# Patient Record
Sex: MALE | Race: OTHER | NOT HISPANIC OR LATINO | URBAN - METROPOLITAN AREA
[De-identification: names, ages, dates, MRNs, and addresses within clinical notes are randomized per-mention and may not be internally consistent; named-entity substitution may affect disease eponyms.]

---

## 2017-09-20 ENCOUNTER — EMERGENCY (EMERGENCY)
Facility: HOSPITAL | Age: 67
LOS: 1 days | Discharge: HOME | End: 2017-09-20
Attending: EMERGENCY MEDICINE | Admitting: EMERGENCY MEDICINE
Payer: MEDICAID

## 2017-09-20 VITALS
RESPIRATION RATE: 15 BRPM | TEMPERATURE: 98 F | HEART RATE: 78 BPM | OXYGEN SATURATION: 99 % | DIASTOLIC BLOOD PRESSURE: 75 MMHG | SYSTOLIC BLOOD PRESSURE: 129 MMHG

## 2017-09-20 DIAGNOSIS — Z98.89 OTHER SPECIFIED POSTPROCEDURAL STATES: Chronic | ICD-10-CM

## 2017-09-20 DIAGNOSIS — Z98.49 CATARACT EXTRACTION STATUS, UNSPECIFIED EYE: Chronic | ICD-10-CM

## 2017-09-20 LAB
ALBUMIN SERPL ELPH-MCNC: 4.1 G/DL — SIGNIFICANT CHANGE UP (ref 3.3–5)
ALP SERPL-CCNC: 77 U/L — SIGNIFICANT CHANGE UP (ref 40–120)
ALT FLD-CCNC: 19 U/L — SIGNIFICANT CHANGE UP (ref 4–41)
APPEARANCE UR: CLEAR — SIGNIFICANT CHANGE UP
AST SERPL-CCNC: 20 U/L — SIGNIFICANT CHANGE UP (ref 4–40)
BASOPHILS # BLD AUTO: 0.05 K/UL — SIGNIFICANT CHANGE UP (ref 0–0.2)
BASOPHILS NFR BLD AUTO: 0.7 % — SIGNIFICANT CHANGE UP (ref 0–2)
BILIRUB SERPL-MCNC: 0.3 MG/DL — SIGNIFICANT CHANGE UP (ref 0.2–1.2)
BILIRUB UR-MCNC: NEGATIVE — SIGNIFICANT CHANGE UP
BLOOD UR QL VISUAL: NEGATIVE — SIGNIFICANT CHANGE UP
BUN SERPL-MCNC: 30 MG/DL — HIGH (ref 7–23)
CALCIUM SERPL-MCNC: 8.7 MG/DL — SIGNIFICANT CHANGE UP (ref 8.4–10.5)
CHLORIDE SERPL-SCNC: 111 MMOL/L — HIGH (ref 98–107)
CK SERPL-CCNC: 291 U/L — HIGH (ref 30–200)
CO2 SERPL-SCNC: 17 MMOL/L — LOW (ref 22–31)
COLOR SPEC: YELLOW — SIGNIFICANT CHANGE UP
CREAT SERPL-MCNC: 1.34 MG/DL — HIGH (ref 0.5–1.3)
EOSINOPHIL # BLD AUTO: 0.38 K/UL — SIGNIFICANT CHANGE UP (ref 0–0.5)
EOSINOPHIL NFR BLD AUTO: 5.2 % — SIGNIFICANT CHANGE UP (ref 0–6)
GLUCOSE SERPL-MCNC: 174 MG/DL — HIGH (ref 70–99)
GLUCOSE UR-MCNC: 50 — SIGNIFICANT CHANGE UP
HCT VFR BLD CALC: 38.7 % — LOW (ref 39–50)
HGB BLD-MCNC: 13.3 G/DL — SIGNIFICANT CHANGE UP (ref 13–17)
HYALINE CASTS # UR AUTO: SIGNIFICANT CHANGE UP (ref 0–?)
IMM GRANULOCYTES # BLD AUTO: 0.01 # — SIGNIFICANT CHANGE UP
IMM GRANULOCYTES NFR BLD AUTO: 0.1 % — SIGNIFICANT CHANGE UP (ref 0–1.5)
KETONES UR-MCNC: NEGATIVE — SIGNIFICANT CHANGE UP
LEUKOCYTE ESTERASE UR-ACNC: NEGATIVE — SIGNIFICANT CHANGE UP
LIDOCAIN IGE QN: 73.4 U/L — HIGH (ref 7–60)
LYMPHOCYTES # BLD AUTO: 2.67 K/UL — SIGNIFICANT CHANGE UP (ref 1–3.3)
LYMPHOCYTES # BLD AUTO: 36.3 % — SIGNIFICANT CHANGE UP (ref 13–44)
MCHC RBC-ENTMCNC: 31.1 PG — SIGNIFICANT CHANGE UP (ref 27–34)
MCHC RBC-ENTMCNC: 34.4 % — SIGNIFICANT CHANGE UP (ref 32–36)
MCV RBC AUTO: 90.6 FL — SIGNIFICANT CHANGE UP (ref 80–100)
MONOCYTES # BLD AUTO: 0.67 K/UL — SIGNIFICANT CHANGE UP (ref 0–0.9)
MONOCYTES NFR BLD AUTO: 9.1 % — SIGNIFICANT CHANGE UP (ref 2–14)
MUCOUS THREADS # UR AUTO: SIGNIFICANT CHANGE UP
NEUTROPHILS # BLD AUTO: 3.58 K/UL — SIGNIFICANT CHANGE UP (ref 1.8–7.4)
NEUTROPHILS NFR BLD AUTO: 48.6 % — SIGNIFICANT CHANGE UP (ref 43–77)
NITRITE UR-MCNC: NEGATIVE — SIGNIFICANT CHANGE UP
NRBC # FLD: 0 — SIGNIFICANT CHANGE UP
PH UR: 7 — SIGNIFICANT CHANGE UP (ref 4.6–8)
PLATELET # BLD AUTO: 192 K/UL — SIGNIFICANT CHANGE UP (ref 150–400)
PMV BLD: 9.7 FL — SIGNIFICANT CHANGE UP (ref 7–13)
POTASSIUM SERPL-MCNC: 3.9 MMOL/L — SIGNIFICANT CHANGE UP (ref 3.5–5.3)
POTASSIUM SERPL-SCNC: 3.9 MMOL/L — SIGNIFICANT CHANGE UP (ref 3.5–5.3)
PROT SERPL-MCNC: 6.9 G/DL — SIGNIFICANT CHANGE UP (ref 6–8.3)
PROT UR-MCNC: 20 — SIGNIFICANT CHANGE UP
RBC # BLD: 4.27 M/UL — SIGNIFICANT CHANGE UP (ref 4.2–5.8)
RBC # FLD: 13.4 % — SIGNIFICANT CHANGE UP (ref 10.3–14.5)
RBC CASTS # UR COMP ASSIST: SIGNIFICANT CHANGE UP (ref 0–?)
SODIUM SERPL-SCNC: 139 MMOL/L — SIGNIFICANT CHANGE UP (ref 135–145)
SP GR SPEC: 1.02 — SIGNIFICANT CHANGE UP (ref 1–1.03)
UROBILINOGEN FLD QL: NORMAL E.U. — SIGNIFICANT CHANGE UP (ref 0.1–0.2)
WBC # BLD: 7.36 K/UL — SIGNIFICANT CHANGE UP (ref 3.8–10.5)
WBC # FLD AUTO: 7.36 K/UL — SIGNIFICANT CHANGE UP (ref 3.8–10.5)
WBC UR QL: SIGNIFICANT CHANGE UP (ref 0–?)

## 2017-09-20 PROCEDURE — 71020: CPT | Mod: 26

## 2017-09-20 PROCEDURE — 99285 EMERGENCY DEPT VISIT HI MDM: CPT

## 2017-09-20 RX ORDER — ASPIRIN/CALCIUM CARB/MAGNESIUM 324 MG
162 TABLET ORAL ONCE
Qty: 0 | Refills: 0 | Status: COMPLETED | OUTPATIENT
Start: 2017-09-20 | End: 2017-09-20

## 2017-09-20 NOTE — ED PROVIDER NOTE - ATTENDING CONTRIBUTION TO CARE
Dr. Olvera: I have personally seen and examined this patient at the bedside. I have fully participated in the care of this patient. I have reviewed all pertinent clinical information, including history, physical exam, plan and the Resident's note and agree except as noted. HPI above as by me. PE above as by me. 67M heart score 3 p/w exertional cp and epigas abd pain. EKG nsr nl axis isolated st abn v2 neg reciprocal. PLAN ce x 1, xr, cdu for ce#2 and nuc stress.

## 2017-09-20 NOTE — ED PROVIDER NOTE - PSH
History of cholecystectomy  1977  S/P appendectomy  1966  S/P arthroscopy of shoulder  left -2013, right -2014  S/P cataract extraction  bilateral eyes

## 2017-09-20 NOTE — ED PROVIDER NOTE - OBJECTIVE STATEMENT
22:02 May att: 67M c/o wk, numb, cp, abd pain. Multiple complaints. Past one week numbness bilateral hand, tip of tongue, entire stomach. Weakness, eyes feel like closing. Abd pain, upper epigastrum, constant, churning, nonrad. Denies f, n, v. Also notes chest heaviness, worse with treadmill use, "associated with dizzy and warm flushing." No known cad, no stents, stress yr unk, angio 1 yr ago.  ALL stemetial PMH hld, dm PSH bessie, appy MED simva, ramipril, glucophage, metformin, insulin PCP Skip

## 2017-09-20 NOTE — ED PROVIDER NOTE - MEDICAL DECISION MAKING DETAILS
Olvera: 67M heart score 3 p/w exertional cp and epigas abd pain. EKG nsr nl axis isolated st abn v2 neg reciprocal. PLAN ce x 1, xr, cdu for ce#2 and nuc stress

## 2017-09-20 NOTE — ED ADULT TRIAGE NOTE - CHIEF COMPLAINT QUOTE
Pt c/o lethargy, "dull" upper abdominal pain that becomes "numb" for past couple of days.  PMHx HTN, DM

## 2017-09-20 NOTE — ED PROVIDER NOTE - FAMILY HISTORY
Family history of pancreatic cancer     Father  Still living? Unknown  Diabetes mellitus, type 2, Age at diagnosis: Age Unknown

## 2017-09-20 NOTE — ED ADULT NURSE NOTE - OBJECTIVE STATEMENT
Celina RN: received pt to room 2 for evaluation of multiple complaints- intermittent b/l arm and leg numbness and tingling, with tip of tongue numbness, abdominal "fire" and numbness x weeks, unresolved with unsteady gait and trip and fall x couple days ago. pt also endorses feeling lethargic and weak and states " I feel like something is wrong with my body". pt presents awake a&ox4, denies dizziness or ha. skin warm, dry, appropriate for race. abrasion to right knee secondary to fall per pt. respirations even unlabored. lungs cta, denies cp or sob. abdomen soft nontender nondistended. denies n/v/d. denies fever or chills. ivl placed. bloods drawn and sent. daughters at bedside. awaiting md evaluation. report to primary RN Juliana

## 2017-09-21 VITALS
TEMPERATURE: 98 F | SYSTOLIC BLOOD PRESSURE: 121 MMHG | RESPIRATION RATE: 16 BRPM | OXYGEN SATURATION: 100 % | DIASTOLIC BLOOD PRESSURE: 64 MMHG | HEART RATE: 73 BPM

## 2017-09-21 LAB
CK MB BLD-MCNC: 3.1 — HIGH (ref 0–2.5)
CK MB BLD-MCNC: 3.2 — HIGH (ref 0–2.5)
CK MB BLD-MCNC: 8.59 NG/ML — HIGH (ref 1–6.6)
CK MB BLD-MCNC: 9.21 NG/ML — HIGH (ref 1–6.6)
CK SERPL-CCNC: 276 U/L — HIGH (ref 30–200)
HBA1C BLD-MCNC: 8.1 % — HIGH (ref 4–5.6)
TROPONIN T SERPL-MCNC: < 0.06 NG/ML — SIGNIFICANT CHANGE UP (ref 0–0.06)
TROPONIN T SERPL-MCNC: < 0.06 NG/ML — SIGNIFICANT CHANGE UP (ref 0–0.06)

## 2017-09-21 PROCEDURE — 74177 CT ABD & PELVIS W/CONTRAST: CPT | Mod: 26

## 2017-09-21 PROCEDURE — 99236 HOSP IP/OBS SAME DATE HI 85: CPT

## 2017-09-21 PROCEDURE — 93016 CV STRESS TEST SUPVJ ONLY: CPT | Mod: GC

## 2017-09-21 PROCEDURE — 93018 CV STRESS TEST I&R ONLY: CPT | Mod: GC

## 2017-09-21 PROCEDURE — 78452 HT MUSCLE IMAGE SPECT MULT: CPT | Mod: 26

## 2017-09-21 RX ORDER — GLUCAGON INJECTION, SOLUTION 0.5 MG/.1ML
1 INJECTION, SOLUTION SUBCUTANEOUS ONCE
Qty: 0 | Refills: 0 | Status: DISCONTINUED | OUTPATIENT
Start: 2017-09-21 | End: 2017-09-24

## 2017-09-21 RX ORDER — INSULIN LISPRO 100/ML
16 VIAL (ML) SUBCUTANEOUS
Qty: 0 | Refills: 0 | Status: DISCONTINUED | OUTPATIENT
Start: 2017-09-21 | End: 2017-09-24

## 2017-09-21 RX ORDER — SODIUM CHLORIDE 9 MG/ML
1000 INJECTION, SOLUTION INTRAVENOUS
Qty: 0 | Refills: 0 | Status: DISCONTINUED | OUTPATIENT
Start: 2017-09-21 | End: 2017-09-24

## 2017-09-21 RX ORDER — DEXTROSE 50 % IN WATER 50 %
25 SYRINGE (ML) INTRAVENOUS ONCE
Qty: 0 | Refills: 0 | Status: DISCONTINUED | OUTPATIENT
Start: 2017-09-21 | End: 2017-09-24

## 2017-09-21 RX ORDER — ACETAZOLAMIDE 250 MG/1
500 TABLET ORAL DAILY
Qty: 0 | Refills: 0 | Status: DISCONTINUED | OUTPATIENT
Start: 2017-09-21 | End: 2017-09-24

## 2017-09-21 RX ORDER — LISINOPRIL 2.5 MG/1
20 TABLET ORAL DAILY
Qty: 0 | Refills: 0 | Status: DISCONTINUED | OUTPATIENT
Start: 2017-09-21 | End: 2017-09-24

## 2017-09-21 RX ORDER — SODIUM CHLORIDE 9 MG/ML
1000 INJECTION INTRAMUSCULAR; INTRAVENOUS; SUBCUTANEOUS ONCE
Qty: 0 | Refills: 0 | Status: COMPLETED | OUTPATIENT
Start: 2017-09-21 | End: 2017-09-21

## 2017-09-21 RX ORDER — DEXTROSE 50 % IN WATER 50 %
1 SYRINGE (ML) INTRAVENOUS ONCE
Qty: 0 | Refills: 0 | Status: DISCONTINUED | OUTPATIENT
Start: 2017-09-21 | End: 2017-09-24

## 2017-09-21 RX ORDER — DEXTROSE 50 % IN WATER 50 %
12.5 SYRINGE (ML) INTRAVENOUS ONCE
Qty: 0 | Refills: 0 | Status: DISCONTINUED | OUTPATIENT
Start: 2017-09-21 | End: 2017-09-24

## 2017-09-21 RX ORDER — INSULIN LISPRO 100/ML
8 VIAL (ML) SUBCUTANEOUS ONCE
Qty: 0 | Refills: 0 | Status: COMPLETED | OUTPATIENT
Start: 2017-09-21 | End: 2017-09-21

## 2017-09-21 RX ORDER — ATORVASTATIN CALCIUM 80 MG/1
40 TABLET, FILM COATED ORAL AT BEDTIME
Qty: 0 | Refills: 0 | Status: DISCONTINUED | OUTPATIENT
Start: 2017-09-21 | End: 2017-09-24

## 2017-09-21 RX ADMIN — ACETAZOLAMIDE 500 MILLIGRAM(S): 250 TABLET ORAL at 12:07

## 2017-09-21 RX ADMIN — SODIUM CHLORIDE 1000 MILLILITER(S): 9 INJECTION INTRAMUSCULAR; INTRAVENOUS; SUBCUTANEOUS at 09:46

## 2017-09-21 RX ADMIN — Medication 16 UNIT(S): at 13:11

## 2017-09-21 RX ADMIN — Medication 162 MILLIGRAM(S): at 00:19

## 2017-09-21 RX ADMIN — Medication 8 UNIT(S): at 09:30

## 2017-09-21 RX ADMIN — LISINOPRIL 20 MILLIGRAM(S): 2.5 TABLET ORAL at 05:13

## 2017-09-21 NOTE — ED CDU PROVIDER NOTE - MEDICAL DECISION MAKING DETAILS
68 y/o M sent to CDU for r/o ACS w/u; upon examination pt with tender LUQ; given hx will get CT abdomen to r/o pancreatic mass.

## 2017-09-21 NOTE — ED CDU PROVIDER NOTE - CHPI ED SYMPTOMS NEG
no chills/no nausea/no cough/no syncope/no diaphoresis/no fever/no dizziness/no vomiting/no back pain

## 2017-09-21 NOTE — ED CDU PROVIDER NOTE - PROGRESS NOTE DETAILS
CDU MILENA CISNEROS - Pt resting comfortably. No complaints. VSS. CE x 2 negative. Stress pending this am. Will continue to observe and reassess in am CDU DC note Eric: Pt. NAD at time of DC. Was concerned re: panc. Ca, denies wt. loss, no jaundice, no pale stools. Had neg. CT in ED, to FU PMD and Cardiology as outpt. Pt. may take H2 blocker empirically prior to PMD visit.

## 2017-09-21 NOTE — ED CDU PROVIDER NOTE - OBJECTIVE STATEMENT
66 y/o M PMH HTN, DM, HLD c/o epigastric/luq pain x 2 weeks with associated bloating and decreased appetite. Pt also notes exertional CP, EMERSON, and orthopnea for the same time frame. 68 y/o M PMH HTN, DM, HLD c/o epigastric/luq pain x 2 weeks with associated bloating and decreased appetite. Pt also notes exertional CP, EMERSON, and orthopnea for the same time frame. Also notes "numbness" to his hands and perioral area - when asked if pt was able to feel light touch there he said "yes. it feels more like a heaviness". Pt concerned because his father had pancreatic ca. Denies fever, chills, cough, N/V/D/constipation, dysuria, hematuria, back pain, le edema. LBM today - normal.

## 2017-09-21 NOTE — ED CDU PROVIDER NOTE - ATTENDING CONTRIBUTION TO CARE
Seen and examined, have discussed plan of care with PA.   I agree with note as stated above, having amended the EMR as needed to reflect the findings. Pt. NAD at time of exam, returned from stress test, denies sx during test. States last had treadmill stress ~3 yrs ago, was done as "routine" with no sx. Clear lungs, heart reg, no murmur. Has PMD FU.

## 2018-06-01 ENCOUNTER — OUTPATIENT (OUTPATIENT)
Dept: OUTPATIENT SERVICES | Facility: HOSPITAL | Age: 68
LOS: 1 days | End: 2018-06-01
Payer: MEDICAID

## 2018-06-01 DIAGNOSIS — Z98.49 CATARACT EXTRACTION STATUS, UNSPECIFIED EYE: Chronic | ICD-10-CM

## 2018-06-01 DIAGNOSIS — Z98.89 OTHER SPECIFIED POSTPROCEDURAL STATES: Chronic | ICD-10-CM

## 2018-06-01 PROCEDURE — G9001: CPT

## 2018-06-14 DIAGNOSIS — R69 ILLNESS, UNSPECIFIED: ICD-10-CM

## 2018-07-01 ENCOUNTER — OUTPATIENT (OUTPATIENT)
Dept: OUTPATIENT SERVICES | Facility: HOSPITAL | Age: 68
LOS: 1 days | End: 2018-07-01

## 2018-07-01 DIAGNOSIS — Z98.49 CATARACT EXTRACTION STATUS, UNSPECIFIED EYE: Chronic | ICD-10-CM

## 2018-07-01 DIAGNOSIS — Z98.89 OTHER SPECIFIED POSTPROCEDURAL STATES: Chronic | ICD-10-CM

## 2018-07-04 ENCOUNTER — EMERGENCY (EMERGENCY)
Facility: HOSPITAL | Age: 68
LOS: 1 days | Discharge: HOME | End: 2018-07-04
Attending: EMERGENCY MEDICINE | Admitting: EMERGENCY MEDICINE
Payer: MEDICAID

## 2018-07-04 VITALS
RESPIRATION RATE: 17 BRPM | OXYGEN SATURATION: 100 % | SYSTOLIC BLOOD PRESSURE: 106 MMHG | HEART RATE: 68 BPM | DIASTOLIC BLOOD PRESSURE: 64 MMHG

## 2018-07-04 VITALS
DIASTOLIC BLOOD PRESSURE: 68 MMHG | OXYGEN SATURATION: 100 % | SYSTOLIC BLOOD PRESSURE: 100 MMHG | TEMPERATURE: 98 F | RESPIRATION RATE: 18 BRPM | HEART RATE: 72 BPM

## 2018-07-04 DIAGNOSIS — Z98.49 CATARACT EXTRACTION STATUS, UNSPECIFIED EYE: Chronic | ICD-10-CM

## 2018-07-04 DIAGNOSIS — Z98.89 OTHER SPECIFIED POSTPROCEDURAL STATES: Chronic | ICD-10-CM

## 2018-07-04 LAB
ALBUMIN SERPL ELPH-MCNC: 4.4 G/DL — SIGNIFICANT CHANGE UP (ref 3.3–5)
ALP SERPL-CCNC: 102 U/L — SIGNIFICANT CHANGE UP (ref 40–120)
ALT FLD-CCNC: 15 U/L — SIGNIFICANT CHANGE UP (ref 4–41)
APPEARANCE UR: CLEAR — SIGNIFICANT CHANGE UP
AST SERPL-CCNC: 17 U/L — SIGNIFICANT CHANGE UP (ref 4–40)
BASOPHILS # BLD AUTO: 0.05 K/UL — SIGNIFICANT CHANGE UP (ref 0–0.2)
BASOPHILS NFR BLD AUTO: 0.5 % — SIGNIFICANT CHANGE UP (ref 0–2)
BILIRUB SERPL-MCNC: 0.3 MG/DL — SIGNIFICANT CHANGE UP (ref 0.2–1.2)
BILIRUB UR-MCNC: NEGATIVE — SIGNIFICANT CHANGE UP
BLOOD UR QL VISUAL: NEGATIVE — SIGNIFICANT CHANGE UP
BUN SERPL-MCNC: 28 MG/DL — HIGH (ref 7–23)
CALCIUM SERPL-MCNC: 8.3 MG/DL — LOW (ref 8.4–10.5)
CHLORIDE SERPL-SCNC: 114 MMOL/L — HIGH (ref 98–107)
CK SERPL-CCNC: 244 U/L — HIGH (ref 30–200)
CO2 SERPL-SCNC: 19 MMOL/L — LOW (ref 22–31)
COLOR SPEC: SIGNIFICANT CHANGE UP
CREAT SERPL-MCNC: 1.26 MG/DL — SIGNIFICANT CHANGE UP (ref 0.5–1.3)
EOSINOPHIL # BLD AUTO: 0.4 K/UL — SIGNIFICANT CHANGE UP (ref 0–0.5)
EOSINOPHIL NFR BLD AUTO: 4 % — SIGNIFICANT CHANGE UP (ref 0–6)
GLUCOSE SERPL-MCNC: 43 MG/DL — CRITICAL LOW (ref 70–99)
GLUCOSE UR-MCNC: 300 — SIGNIFICANT CHANGE UP
HCT VFR BLD CALC: 37.3 % — LOW (ref 39–50)
HGB BLD-MCNC: 12.7 G/DL — LOW (ref 13–17)
IMM GRANULOCYTES # BLD AUTO: 0.04 # — SIGNIFICANT CHANGE UP
IMM GRANULOCYTES NFR BLD AUTO: 0.4 % — SIGNIFICANT CHANGE UP (ref 0–1.5)
KETONES UR-MCNC: NEGATIVE — SIGNIFICANT CHANGE UP
LEUKOCYTE ESTERASE UR-ACNC: NEGATIVE — SIGNIFICANT CHANGE UP
LYMPHOCYTES # BLD AUTO: 1.7 K/UL — SIGNIFICANT CHANGE UP (ref 1–3.3)
LYMPHOCYTES # BLD AUTO: 17.1 % — SIGNIFICANT CHANGE UP (ref 13–44)
MCHC RBC-ENTMCNC: 31.1 PG — SIGNIFICANT CHANGE UP (ref 27–34)
MCHC RBC-ENTMCNC: 34 % — SIGNIFICANT CHANGE UP (ref 32–36)
MCV RBC AUTO: 91.4 FL — SIGNIFICANT CHANGE UP (ref 80–100)
MONOCYTES # BLD AUTO: 0.73 K/UL — SIGNIFICANT CHANGE UP (ref 0–0.9)
MONOCYTES NFR BLD AUTO: 7.3 % — SIGNIFICANT CHANGE UP (ref 2–14)
MUCOUS THREADS # UR AUTO: SIGNIFICANT CHANGE UP
NEUTROPHILS # BLD AUTO: 7.03 K/UL — SIGNIFICANT CHANGE UP (ref 1.8–7.4)
NEUTROPHILS NFR BLD AUTO: 70.7 % — SIGNIFICANT CHANGE UP (ref 43–77)
NITRITE UR-MCNC: NEGATIVE — SIGNIFICANT CHANGE UP
NRBC # FLD: 0 — SIGNIFICANT CHANGE UP
PH UR: 6.5 — SIGNIFICANT CHANGE UP (ref 4.6–8)
PLATELET # BLD AUTO: 169 K/UL — SIGNIFICANT CHANGE UP (ref 150–400)
PMV BLD: 9.2 FL — SIGNIFICANT CHANGE UP (ref 7–13)
POTASSIUM SERPL-MCNC: 3.2 MMOL/L — LOW (ref 3.5–5.3)
POTASSIUM SERPL-SCNC: 3.2 MMOL/L — LOW (ref 3.5–5.3)
PROT SERPL-MCNC: 6.9 G/DL — SIGNIFICANT CHANGE UP (ref 6–8.3)
PROT UR-MCNC: 20 MG/DL — SIGNIFICANT CHANGE UP
RBC # BLD: 4.08 M/UL — LOW (ref 4.2–5.8)
RBC # FLD: 13.3 % — SIGNIFICANT CHANGE UP (ref 10.3–14.5)
RBC CASTS # UR COMP ASSIST: SIGNIFICANT CHANGE UP (ref 0–?)
SODIUM SERPL-SCNC: 144 MMOL/L — SIGNIFICANT CHANGE UP (ref 135–145)
SP GR SPEC: 1.01 — SIGNIFICANT CHANGE UP (ref 1–1.04)
SQUAMOUS # UR AUTO: SIGNIFICANT CHANGE UP
UROBILINOGEN FLD QL: NORMAL MG/DL — SIGNIFICANT CHANGE UP
WBC # BLD: 9.95 K/UL — SIGNIFICANT CHANGE UP (ref 3.8–10.5)
WBC # FLD AUTO: 9.95 K/UL — SIGNIFICANT CHANGE UP (ref 3.8–10.5)
WBC UR QL: SIGNIFICANT CHANGE UP (ref 0–?)

## 2018-07-04 PROCEDURE — 99285 EMERGENCY DEPT VISIT HI MDM: CPT | Mod: 25

## 2018-07-04 PROCEDURE — 93010 ELECTROCARDIOGRAM REPORT: CPT

## 2018-07-04 PROCEDURE — 71045 X-RAY EXAM CHEST 1 VIEW: CPT | Mod: 26

## 2018-07-04 RX ORDER — ALBUTEROL 90 UG/1
2.5 AEROSOL, METERED ORAL ONCE
Qty: 0 | Refills: 0 | Status: COMPLETED | OUTPATIENT
Start: 2018-07-04 | End: 2018-07-04

## 2018-07-04 RX ORDER — DEXTROSE 50 % IN WATER 50 %
50 SYRINGE (ML) INTRAVENOUS ONCE
Qty: 0 | Refills: 0 | Status: COMPLETED | OUTPATIENT
Start: 2018-07-04 | End: 2018-07-04

## 2018-07-04 RX ADMIN — ALBUTEROL 2.5 MILLIGRAM(S): 90 AEROSOL, METERED ORAL at 22:30

## 2018-07-04 RX ADMIN — Medication 50 MILLILITER(S): at 22:12

## 2018-07-04 NOTE — ED PROVIDER NOTE - PROGRESS NOTE DETAILS
mis: PT seen and reassessed.  Patient symptomatically improved.   AAOX3, NAD, VSS.  Discussed test results w/ patient. Patient verbalized understanding of hospital course and outpatient plans, has decisional making capacity.  Will f/u w/ pmd in the next few days; patient will call for an appointment. Will return to the ED if there is any worsening of symptoms.  Patient able to ambulate at baseline, is tolerating PO intake

## 2018-07-04 NOTE — ED ADULT TRIAGE NOTE - CHIEF COMPLAINT QUOTE
hypoglycemic/seizure    pt states was in his regular state of health... had last ate at 1430...felt a little hungry but decided to finish house chores outdoors.  felt lightheaded...walked to front of house.... and had witnessed seizure.  rec'd aa&ox4.  denies any pain at this time.  as per ems, acucheck was LO...rec'd d50....rpt accucheck wa 150. in triag ewas 110.  hl from ems is out.  denies tongue biting, incontinence. hypoglycemic/seizure    pt states was in his regular state of health... had last ate at 1430...felt a little hungry but decided to finish house chores outdoors.  felt lightheaded...walked to front of house.... and had witnessed seizure.  rec'd aa&ox4.  denies any pain at this time.  as per ems, acucheck was LO...rec'd d50....rpt accucheck wa 150. in triag ewas 110.  hl from ems is out.  denies tongue biting, incontinence. given juice in triage hypoglycemic/seizure    pt states was in his regular state of health... had last ate at 1430...felt a little hungry but decided to finish house chores outdoors.  felt lightheaded... walked to front of house.... and had witnessed seizure.  rec'd aa&ox4.  denies any pain at this time.  as per ems, accucheck was LO...rec'd d50....rpt accucheck was 150. in triage was 110.  hl from ems is out.  denies tongue biting, incontinence. given juice in triage

## 2018-07-04 NOTE — ED PROVIDER NOTE - ATTENDING CONTRIBUTION TO CARE
IRISH ESQUIVEL  ATTENDING, MD: 67yo M past medical history, IDDM, Hypertension. hyperlipidemia, brought in by EMS for witnessed seizure like activity a/w hypoglycemia. Patient states he checked F/S and noted 110's and then took his insulin without eating.  Patient denies any current complaint.  Patient denies HA, NP, chest pain, SOB, cough, abd pain, N/V/D/C, weakness, dizziness, urinary symptoms, extremity pain or swelling or other complaints.     PHYSICAL EXAM:    GENERAL: Patient is awake and alert and in no acute distress.  Non-toxic appearing.  A+Ox4  HEAD:  Airway patent.  No oropharyngeal edema.  No stridor.  Auricles are normal.    EYES: EOM grossly intact, PERRLA, conjunctiva non-injected and sclera clear  NECK: Supple, No vertebral point tenderness to palpation.  CHEST/LUNG: Lungs clear to auscultation bilaterally; no wheeze, no rhonchi,  no rales.    HEART: Regular rate and rhythm;   ABDOMEN: Soft, non-tender to palpation.  No rebound/no guarding.  Bowel sounds present x 4.   MSK/EXTREMITIES: No clubbing, cyanosis, or edema. Back is nontender, with no vertebral point tenderness to palpation, no CVAT.  Moving all 4 extremities.     NEURO: Neurologically grossly intact.  CN II-XII intact.  5/5 strength x 4 extremities.  Ambulatory without ataxia. No obvious deficits.   PSYCH: Psychiatrically normal mood and affect.  No apparent risk to self or others.       DR. ESQUIVEL, ATTENDING MD:    I performed a face to face bedside interview with patient regarding history of present illness, review of symptoms and past medical history. I completed an independent physical exam.  I have discussed patient's plan of care with the team of health care providers.   I agree with note as stated above, having amended the EMR as needed to reflect my findings. I have discussed the assessment and plan of care.  This includes during the time I functioned as the attending physician for this patient.

## 2018-07-04 NOTE — ED PROVIDER NOTE - MEDICAL DECISION MAKING DETAILS
-impression: symptoms likely due to hypoglycemic sz from taking too much insulin. no e/o infection. pt w/o ha & witnessed to not have head trauma. will assess for pna. will wait till f/s normalize  -plan: labs, EKG, UA, CXR,

## 2018-07-04 NOTE — ED PROVIDER NOTE - OBJECTIVE STATEMENT
69yo m pmh iddm, glaucoma, htn, hld bibems for seizure like activity & hypoglycemia. pt has no complaints right now. 2.30pm had lunch after which f/s was in 110s & then took usual 17U humulog. around 6pm while was watering yard, pt was leaning on fence tremulous & diaphoretic. pt was helped to floor by neighbor & then had whole body shaking with tongue sticking out. ems found f/s to be low, gave amp d50 with immediate wake up & quick back to baseline.   had recent cough, rhinorhea  ROS negative for: fever, chest pain, SOB, Nausea, vomiting, diarrhea, abdominal pain, dysuria, ha, ac use, hx sz

## 2018-07-04 NOTE — ED ADULT NURSE NOTE - OBJECTIVE STATEMENT
pt receieved alert and oriented x3. PMHX DM2,HLD,HTN. pt c/c  doing house chores when he started feeling lightheaded and had  a witnessed seizure with a syncopal episode (+LOC) . pt denies any trauma to head. on EMS arrival pt was found to have a low Accu-Check and received d50. pt denies any chest pain, sob, lightheadedness, dizziness at the moment. pt denies any tongue biting, incontinence. Pt placed on continuous tele monitoring. nsr on cm vss  pt 20 g placed in right a/c. pt waiting for MD vasquez . will continue to monitor . family at bedside with pt, seizure precautions in  place. will continue to monitor

## 2018-07-04 NOTE — ED ADULT NURSE NOTE - CHIEF COMPLAINT QUOTE
hypoglycemic/seizure    pt states was in his regular state of health... had last ate at 1430...felt a little hungry but decided to finish house chores outdoors.  felt lightheaded... walked to front of house.... and had witnessed seizure.  rec'd aa&ox4.  denies any pain at this time.  as per ems, accucheck was LO...rec'd d50....rpt accucheck was 150. in triage was 110.  hl from ems is out.  denies tongue biting, incontinence. given juice in triage

## 2018-07-23 DIAGNOSIS — Z71.89 OTHER SPECIFIED COUNSELING: ICD-10-CM

## 2019-09-23 ENCOUNTER — EMERGENCY (EMERGENCY)
Facility: HOSPITAL | Age: 69
LOS: 1 days | Discharge: HOME | End: 2019-09-23
Attending: EMERGENCY MEDICINE | Admitting: EMERGENCY MEDICINE
Payer: MEDICARE

## 2019-09-23 VITALS
SYSTOLIC BLOOD PRESSURE: 166 MMHG | RESPIRATION RATE: 16 BRPM | DIASTOLIC BLOOD PRESSURE: 75 MMHG | OXYGEN SATURATION: 100 % | HEART RATE: 89 BPM

## 2019-09-23 VITALS
OXYGEN SATURATION: 100 % | DIASTOLIC BLOOD PRESSURE: 54 MMHG | TEMPERATURE: 97 F | SYSTOLIC BLOOD PRESSURE: 101 MMHG | RESPIRATION RATE: 15 BRPM | HEART RATE: 73 BPM

## 2019-09-23 DIAGNOSIS — Z98.89 OTHER SPECIFIED POSTPROCEDURAL STATES: Chronic | ICD-10-CM

## 2019-09-23 DIAGNOSIS — Z98.49 CATARACT EXTRACTION STATUS, UNSPECIFIED EYE: Chronic | ICD-10-CM

## 2019-09-23 LAB
ALBUMIN SERPL ELPH-MCNC: 4.7 G/DL — SIGNIFICANT CHANGE UP (ref 3.3–5)
ALP SERPL-CCNC: 87 U/L — SIGNIFICANT CHANGE UP (ref 40–120)
ALT FLD-CCNC: 23 U/L — SIGNIFICANT CHANGE UP (ref 4–41)
ANION GAP SERPL CALC-SCNC: 13 MMO/L — SIGNIFICANT CHANGE UP (ref 7–14)
AST SERPL-CCNC: 20 U/L — SIGNIFICANT CHANGE UP (ref 4–40)
B-OH-BUTYR SERPL-SCNC: < 0 MMOL/L — LOW (ref 0–0.4)
BASOPHILS # BLD AUTO: 0.05 K/UL — SIGNIFICANT CHANGE UP (ref 0–0.2)
BASOPHILS NFR BLD AUTO: 0.7 % — SIGNIFICANT CHANGE UP (ref 0–2)
BILIRUB SERPL-MCNC: 0.4 MG/DL — SIGNIFICANT CHANGE UP (ref 0.2–1.2)
BUN SERPL-MCNC: 26 MG/DL — HIGH (ref 7–23)
CALCIUM SERPL-MCNC: 8.8 MG/DL — SIGNIFICANT CHANGE UP (ref 8.4–10.5)
CHLORIDE SERPL-SCNC: 105 MMOL/L — SIGNIFICANT CHANGE UP (ref 98–107)
CO2 SERPL-SCNC: 18 MMOL/L — LOW (ref 22–31)
CREAT SERPL-MCNC: 1.14 MG/DL — SIGNIFICANT CHANGE UP (ref 0.5–1.3)
EOSINOPHIL # BLD AUTO: 0.14 K/UL — SIGNIFICANT CHANGE UP (ref 0–0.5)
EOSINOPHIL NFR BLD AUTO: 1.9 % — SIGNIFICANT CHANGE UP (ref 0–6)
GLUCOSE SERPL-MCNC: 396 MG/DL — HIGH (ref 70–99)
HCT VFR BLD CALC: 41.2 % — SIGNIFICANT CHANGE UP (ref 39–50)
HGB BLD-MCNC: 13.3 G/DL — SIGNIFICANT CHANGE UP (ref 13–17)
IMM GRANULOCYTES NFR BLD AUTO: 0.3 % — SIGNIFICANT CHANGE UP (ref 0–1.5)
LYMPHOCYTES # BLD AUTO: 1.28 K/UL — SIGNIFICANT CHANGE UP (ref 1–3.3)
LYMPHOCYTES # BLD AUTO: 17.3 % — SIGNIFICANT CHANGE UP (ref 13–44)
MCHC RBC-ENTMCNC: 30.8 PG — SIGNIFICANT CHANGE UP (ref 27–34)
MCHC RBC-ENTMCNC: 32.3 % — SIGNIFICANT CHANGE UP (ref 32–36)
MCV RBC AUTO: 95.4 FL — SIGNIFICANT CHANGE UP (ref 80–100)
MONOCYTES # BLD AUTO: 0.54 K/UL — SIGNIFICANT CHANGE UP (ref 0–0.9)
MONOCYTES NFR BLD AUTO: 7.3 % — SIGNIFICANT CHANGE UP (ref 2–14)
NEUTROPHILS # BLD AUTO: 5.38 K/UL — SIGNIFICANT CHANGE UP (ref 1.8–7.4)
NEUTROPHILS NFR BLD AUTO: 72.5 % — SIGNIFICANT CHANGE UP (ref 43–77)
NRBC # FLD: 0 K/UL — SIGNIFICANT CHANGE UP (ref 0–0)
PLATELET # BLD AUTO: 172 K/UL — SIGNIFICANT CHANGE UP (ref 150–400)
PMV BLD: 9.7 FL — SIGNIFICANT CHANGE UP (ref 7–13)
POTASSIUM SERPL-MCNC: 3.7 MMOL/L — SIGNIFICANT CHANGE UP (ref 3.5–5.3)
POTASSIUM SERPL-SCNC: 3.7 MMOL/L — SIGNIFICANT CHANGE UP (ref 3.5–5.3)
PROT SERPL-MCNC: 7.7 G/DL — SIGNIFICANT CHANGE UP (ref 6–8.3)
RBC # BLD: 4.32 M/UL — SIGNIFICANT CHANGE UP (ref 4.2–5.8)
RBC # FLD: 13.1 % — SIGNIFICANT CHANGE UP (ref 10.3–14.5)
SODIUM SERPL-SCNC: 136 MMOL/L — SIGNIFICANT CHANGE UP (ref 135–145)
WBC # BLD: 7.41 K/UL — SIGNIFICANT CHANGE UP (ref 3.8–10.5)
WBC # FLD AUTO: 7.41 K/UL — SIGNIFICANT CHANGE UP (ref 3.8–10.5)

## 2019-09-23 PROCEDURE — 99283 EMERGENCY DEPT VISIT LOW MDM: CPT | Mod: GC

## 2019-09-23 NOTE — ED PROVIDER NOTE - PATIENT PORTAL LINK FT
You can access the FollowMyHealth Patient Portal offered by [facility] by registering at the following website: [email or web address]. By joining [employer]’s FollowMyHealth portal, you will also be able to view your health information using other applications (apps) compatible with our system.

## 2019-09-23 NOTE — ED PROVIDER NOTE - CLINICAL SUMMARY MEDICAL DECISION MAKING FREE TEXT BOX
68 y/o M w/ PMH of IDDM, HTN, HLD p/w hypoglycemia. He was complaining of feeling sweaty and diaphoresis at 4:00pm and tried to drink some juice but it didn't resolve. His daughter then decided to call EMS and he was found to have a FS of 56 and gave him a D50 amp and a lot of juice and he vomited afterwards and was brought into the ED afterwards.  He also claimed to have eaten plums at 2pm but the children said he lied and hasn't eaten since breakfast and still took his premeal novolog 15U and has had had similar c/o hypoglycemia x3 now.

## 2019-09-23 NOTE — ED PROVIDER NOTE - NSFOLLOWUPINSTRUCTIONS_ED_ALL_ED_FT
Please followup with your endocrinologist on your doses of Novolog and Tarceva. Would possibly need adjustments and please continue having PO intake. Would need to monitor your fingersticks and keep a food log also.

## 2019-09-23 NOTE — ED ADULT NURSE NOTE - OBJECTIVE STATEMENT
Facilitator RN - Pt. received in room 13, A&Ox4, ambulatory. Pt. arrives with 18 gauge IV inserted in left ac by EMS. Pt. with hx of DM and glaucoma brought in by EMS for hypoglycemia. Pt. states he barely ate due to nausea and took his insulin at home, per family pt. was lethargic at home which typically happens when his sugar is low. Per triage note, pt. FS 56 upon EMS arrival, was given orange juice and vomiting, received 1 amp of d50 by EMS.  at present, as per his blood glucose monitoring his glucose is 435. Both taken at same time. MD Luevano aware and is at bedside for evaluation. Pt. denies abd pain, polyuria, polydipsia, polyphagia, chest pain, dizziness, weakness, SOB. Respirations even & unlabored on room air. VS as noted. 20 gauge IV inserted in right wrist, positive blood return, flushes without difficulty. Report given to RAJ Deleon.

## 2019-09-23 NOTE — ED ADULT TRIAGE NOTE - CHIEF COMPLAINT QUOTE
Pt brought in by EMS for hypoglycemia.  Pt states only ate half a muffin today and took his insulin regardless.  As per family, pt appears more lethargic than normal when his sugar is low.  FS 56 upon Ems arrival, drank orange juice but vomited.  Received 1 amp d50 by EMS to 18g to L AC.  PMHx:  cataract, HLD, glaucoma, DM, HTN

## 2019-09-23 NOTE — ED PROVIDER NOTE - OBJECTIVE STATEMENT
70 y/o M w/ PMH of IDDM, HTN, HLD p/w hypoglycemia. Has had similar c/o hypoglycemia in 7/4. 68 y/o M w/ PMH of IDDM, HTN, HLD p/w hypoglycemia. He was complaining of feeling sweaty and diaphoresis at 4:00pm and tried to drink some juice but it didn't resolve. His daughter then decided to call EMS and he was found to have a FS of 56 and gave him a D50 amp and a lot of juice and he vomited afterwards and was brought into the ED afterwards. Has had similar c/o hypoglycemia in 7/4. 70 y/o M w/ PMH of IDDM, HTN, HLD p/w hypoglycemia. He was complaining of feeling sweaty and diaphoresis at 4:00pm and tried to drink some juice but it didn't resolve. His daughter then decided to call EMS and he was found to have a FS of 56 and gave him a D50 amp and a lot of juice and he vomited afterwards and was brought into the ED afterwards.  He also claimed to have eaten plums at 2pm but the children said he lied and hasn't eaten since breakfast and still took his premeal novolog 15U and has had had similar c/o hypoglycemia x3 now.

## 2019-09-23 NOTE — ED PROVIDER NOTE - ATTENDING CONTRIBUTION TO CARE
Patient is a 68 yo M with history of IDDM, HTN, hyperlipidemia here for evaluation after episode of hypoglycemia. Patient checked his BS at 12 pm, 263, used 15 units of Novalog but only ate 2 plums at 2 pm. He felt shaky and sweaty at 4 pm. FS 56 when EMS evaluated him. He received 1 amp and orange juice and subsequently vomit. Patient has been in the hospital for hypoglycemia in the past. Denies fevers, chills, abdominal pain, urinary symptoms.     Tresiba - 45 units at night  Endocrine:     VS noted  Gen. no acute distress, Non toxic   HEENT: EOMI, mmm  Lungs: CTAB/L no C/ W /R   CVS: RRR   Abd; Soft non tender, non distended   Ext: no edema  Skin: no rash  Neuro AAOx3 non focal clear speech  a/p: hypoglycemia - pt did not eat appropriately.   - Bassem RAMSEY

## 2019-09-23 NOTE — ED PROVIDER NOTE - CARE PLAN
Principal Discharge DX:	Hypoglycemia  Goal:	Most likely due to not eating  Assessment and plan of treatment:	Most likely due to not eating

## 2021-11-10 ENCOUNTER — APPOINTMENT (OUTPATIENT)
Dept: UROLOGY | Facility: CLINIC | Age: 71
End: 2021-11-10
Payer: MEDICARE

## 2021-11-10 DIAGNOSIS — Z78.9 OTHER SPECIFIED HEALTH STATUS: ICD-10-CM

## 2021-11-10 DIAGNOSIS — Z87.19 PERSONAL HISTORY OF OTHER DISEASES OF THE DIGESTIVE SYSTEM: ICD-10-CM

## 2021-11-10 DIAGNOSIS — Z86.69 PERSONAL HISTORY OF OTHER DISEASES OF THE NERVOUS SYSTEM AND SENSE ORGANS: ICD-10-CM

## 2021-11-10 DIAGNOSIS — Z63.5 DISRUPTION OF FAMILY BY SEPARATION AND DIVORCE: ICD-10-CM

## 2021-11-10 PROCEDURE — 99204 OFFICE O/P NEW MOD 45 MIN: CPT

## 2021-11-10 SDOH — SOCIAL STABILITY - SOCIAL INSECURITY: DISRUPTION OF FAMILY BY SEPARATION AND DIVORCE: Z63.5

## 2021-11-10 NOTE — PHYSICAL EXAM
[General Appearance - Well Developed] : well developed [General Appearance - Well Nourished] : well nourished [Normal Appearance] : normal appearance [Well Groomed] : well groomed [General Appearance - In No Acute Distress] : no acute distress [Edema] : no peripheral edema [Respiration, Rhythm And Depth] : normal respiratory rhythm and effort [Exaggerated Use Of Accessory Muscles For Inspiration] : no accessory muscle use [Abdomen Soft] : soft [Abdomen Tenderness] : non-tender [Abdomen Mass (___ Cm)] : no abdominal mass palpated [Abdomen Hernia] : no hernia was discovered [Costovertebral Angle Tenderness] : no ~M costovertebral angle tenderness [Urethral Meatus] : meatus normal [Testes Tenderness] : no tenderness of the testes [Testes Mass (___cm)] : there were no testicular masses [FreeTextEntry1] : right hydrocele , sono showed normal testes bilat, good flow to right testes [Normal Station and Gait] : the gait and station were normal for the patient's age [] : no rash [No Focal Deficits] : no focal deficits [Oriented To Time, Place, And Person] : oriented to person, place, and time [Affect] : the affect was normal [Mood] : the mood was normal [Not Anxious] : not anxious [Cervical Lymph Nodes Enlarged Posterior Bilaterally] : posterior cervical [Cervical Lymph Nodes Enlarged Anterior Bilaterally] : anterior cervical [Supraclavicular Lymph Nodes Enlarged Bilaterally] : supraclavicular

## 2021-11-10 NOTE — ASSESSMENT
[FreeTextEntry1] : Hydrocele\par says getting bigger and now  bothered by it\par no LUTS\par no dysuria or hematurai\par no weight chagnes\par no strain to void \par wants surgery for hydrocele\par here for further eval \par 1) office sono showed no scrotal masses with large hydrocele on right \par 2) will refer to colleague\par 3) states had formal scrotal sono this year-will get report

## 2021-11-10 NOTE — HISTORY OF PRESENT ILLNESS
[FreeTextEntry1] : Hydrocele\par says getting bigger and now  bothered by it\par no LUTS\par no dysuria or hematurai\par no weight chagnes\par no strain to void \par wants surgery for hydrocele\par here for further eval

## 2021-11-10 NOTE — REVIEW OF SYSTEMS
[Eyesight Problems] : eyesight problems [Dry Eyes] : dryness of the eyes [Joint Pain] : joint pain [Joint Swelling] : joint swelling [Limb Swelling] : limb swelling [Muscle Weakness] : muscle weakness [Feelings Of Weakness] : feelings of weakness [Negative] : Heme/Lymph [see HPI] : see HPI [Dizziness] : dizziness [Limb Weakness] : limb weakness [Difficulty Walking] : difficulty walking [FreeTextEntry2] : HBP

## 2021-11-19 ENCOUNTER — APPOINTMENT (OUTPATIENT)
Dept: UROLOGY | Facility: CLINIC | Age: 71
End: 2021-11-19

## 2023-07-03 ENCOUNTER — APPOINTMENT (OUTPATIENT)
Dept: UROLOGY | Facility: CLINIC | Age: 73
End: 2023-07-03
Payer: MEDICARE

## 2023-07-03 PROCEDURE — 99213 OFFICE O/P EST LOW 20 MIN: CPT

## 2023-07-03 NOTE — HISTORY OF PRESENT ILLNESS
[FreeTextEntry1] : patient bothered by hydrocele - state now 'totally blind'\par wants sugery \par sl dysuia when wants to void -drinks water, teas/coffee \par admits to a lot of water - seen by Nephrology \par was told bty PCP - Dr Mosley that prostate OK \par loss of 33 lbs with new DM med\par good flow to void and feels empty after void \par here for f/u:

## 2023-07-03 NOTE — ASSESSMENT
[FreeTextEntry1] : patient bothered by hydrocele - state now 'totally blind'\par wants sugery \par sl dysuia when wants to void -drinks water, teas/coffee \par admits to a lot of water - seen by Nephrology \par was told bty PCP - Dr Mosley that prostate OK \par loss of 33 lbs with new DM med\par good flow to void and feels empty after void \par here for f/u:\par 1- scrotal sono \par 2- wants surgery for hydrocele \par 3- will refer to dr Banks for surgery\par

## 2023-07-08 NOTE — ED CDU PROVIDER NOTE - AGGRAVATING FACTORS
ZEYNEP ROSSI  SI-N 3A (Back) 020 A ([facility]-N 3A (Back))      Patient was evaluated and examined  by bedside, c/o mild anxiety , tolerating po diet well, no fever, no hypoxia, no agitation events, oob to chair       REVIEW OF SYSTEMS:  please see pertinent positives mentioned above, all other 12 ROS negative      T(C): , Max: 36.1 (07-07-23 @ 21:14)  HR: 85 (07-08-23 @ 05:00)  BP: 124/72 (07-08-23 @ 05:00)  RR: 20 (07-08-23 @ 05:00)  SpO2: 100% (07-07-23 @ 12:57)  CAPILLARY BLOOD GLUCOSE      POCT Blood Glucose.: 123 mg/dL (08 Jul 2023 06:25)  POCT Blood Glucose.: 104 mg/dL (07 Jul 2023 21:00)  POCT Blood Glucose.: 107 mg/dL (07 Jul 2023 16:51)  POCT Blood Glucose.: 99 mg/dL (07 Jul 2023 11:24)      PHYSICAL EXAM:  General: NAD, AAOX3, patient is laying comfortably in bed  HEENT: AT, NC, trach present  Lungs: good breath sounds B/L, no wheezing, no rhonchi  CVS: normal S1, S2, RRR, NO M/G/R  Abdomen: soft, bowel sounds present, non-tender, non-distended, peg present  Extremities: no edema, no clubbing, no cyanosis, positive peripheral pulses b/l  Neuro: no acute focal neurological deficits, generalized body weakness  Skin: dry scaly skin on hands/feet      LAB  CBC  Date: 07-07-23 @ 08:33  Mean cell Blshlaxxzq16.9  Mean cell Hemoglobin Conc30.3  Mean cell Volum 78.9  Platelet count-Automate 586  RBC Count 3.60  Red Cell Distrib Width26.9  WBC Count10.49  % Albumin, Urine--  Hematocrit 28.4  Hemoglobin 8.6  CBC  Date: 07-06-23 @ 12:34  Mean cell Xcbkhzfuct70.2  Mean cell Hemoglobin Conc30.9  Mean cell Volum 78.6  Platelet count-Automate 554  RBC Count 3.59  Red Cell Distrib Width26.8  WBC Count12.42  % Albumin, Urine--  Hematocrit 28.2  Hemoglobin 8.7  CBC  Date: 07-05-23 @ 07:54  Mean cell Nczbifeksl91.8  Mean cell Hemoglobin Conc30.7  Mean cell Volum 77.6  Platelet count-Automate 548  RBC Count 3.44  Red Cell Distrib Width27.4  WBC Count11.72  % Albumin, Urine--  Hematocrit 26.7  Hemoglobin 8.2  CBC  Date: 07-04-23 @ 05:25  Mean cell Hgkhrufgox01.0  Mean cell Hemoglobin Conc30.4  Mean cell Volum 79.2  Platelet count-Automate 464  RBC Count 3.41  Red Cell Distrib Width27.2  WBC Count12.61  % Albumin, Urine--  Hematocrit 27.0  Hemoglobin 8.2  CBC  Date: 07-03-23 @ 06:02  Mean cell Zmzzzkfcja51.6  Mean cell Hemoglobin Conc30.0  Mean cell Volum 78.7  Platelet count-Automate 318  RBC Count 3.56  Red Cell Distrib Width27.9  WBC Count12.43  % Albumin, Urine--  Hematocrit 28.0  Hemoglobin 8.4  CBC  Date: 07-02-23 @ 09:13  Mean cell Bjanllzwvn74.7  Mean cell Hemoglobin Conc30.4  Mean cell Volum 78.0  Platelet count-Automate 406  RBC Count 3.50  Red Cell Distrib Width27.3  WBC Count12.85  % Albumin, Urine--  Hematocrit 27.3  Hemoglobin 8.3    BMP  07-08-23 @ 01:28  Blood Gas Arterial-Calcium,Ionized--  Blood Urea Nitrogen, Serum 15 mg/dL [10 - 20]  Carbon Dioxide, Serum28 mmol/L [17 - 32]  Chloride, Serum97 mmol/L<L> [98 - 110]  Creatinie, Serum0.8 mg/dL [0.7 - 1.5]  Glucose, Fddql026 mg/dL<H> [70 - 99]  Potassium, Serum4.5 mmol/L [3.5 - 5.0]  Sodium, Serum 134 mmol/L<L> [135 - 146]  BMP  07-07-23 @ 21:39  Blood Gas Arterial-Calcium,Ionized--  Blood Urea Nitrogen, Serum 15 mg/dL [10 - 20]  Carbon Dioxide, Serum28 mmol/L [17 - 32]  Chloride, Serum92 mmol/L<L> [98 - 110]  Creatinie, Serum0.8 mg/dL [0.7 - 1.5]  Glucose, Ibwdf065 mg/dL<H> [70 - 99]  Potassium, Serum4.5 mmol/L [3.5 - 5.0]  Sodium, Serum 129 mmol/L<L> [135 - 146]  [facility]  07-07-23 @ 08:33  Blood Gas Arterial-Calcium,Ionized--  Blood Urea Nitrogen, Serum 16 mg/dL [10 - 20]  Carbon Dioxide, Serum29 mmol/L [17 - 32]  Chloride, Serum94 mmol/L<L> [98 - 110]  Creatinie, Serum0.8 mg/dL [0.7 - 1.5]  Glucose, Gkmlz421 mg/dL<H> [70 - 99]  Potassium, Serum5.5 mmol/L<H> [3.5 - 5.0]  Sodium, Serum 133 mmol/L<L> [135 - 146]  [facility]  07-06-23 @ 12:34  Blood Gas Arterial-Calcium,Ionized--  Blood Urea Nitrogen, Serum 17 mg/dL [10 - 20]  Carbon Dioxide, Serum29 mmol/L [17 - 32]  Chloride, Serum91 mmol/L<L> [98 - 110]  Creatinie, Serum0.9 mg/dL [0.7 - 1.5]  Glucose, Owwnk506 mg/dL<H> [70 - 99]  Potassium, Serum4.9 mmol/L [3.5 - 5.0]  Sodium, Serum 131 mmol/L<L> [135 - 146]  [facility]  07-05-23 @ 16:43  Blood Gas Arterial-Calcium,Ionized--  Blood Urea Nitrogen, Serum 16 mg/dL [10 - 20]  Carbon Dioxide, Serum27 mmol/L [17 - 32]  Chloride, Serum94 mmol/L<L> [98 - 110]  Creatinie, Serum0.9 mg/dL [0.7 - 1.5]  Glucose, Njfry494 mg/dL<H> [70 - 99]  Potassium, Serum5.3 mmol/L<H> [3.5 - 5.0]  Sodium, Serum 133 mmol/L<L> [135 - 146]  [facility]  07-05-23 @ 11:58  Blood Gas Arterial-Calcium,Ionized--  Blood Urea Nitrogen, Serum 16 mg/dL [10 - 20]  Carbon Dioxide, Serum25 mmol/L [17 - 32]  Chloride, Serum94 mmol/L<L> [98 - 110]  Creatinie, Serum0.9 mg/dL [0.7 - 1.5]  Glucose, Ufdvf411 mg/dL<H> [70 - 99]  Potassium, Serum6.1 mmol/L<HH> [3.5 - 5.0] [Hemolyzed. Interpret with caution  Critical value:  TYPE:(C=Critical, N=Notification, A=Abnormal) C  TESTS: K  DATE/TIME CALLED: 07/05/2023 14:10:15 EDT  CALLED TO: DR. SWENSON  READ BACK (2 Patient Identifiers)(Y/N): Y  READ BACK VALUES (Y/N): Y  CALLED BY: [facility]]  Sodium, Serum 132 mmol/L<L> [135 - 146]  [facility]  07-05-23 @ 07:54  Blood Gas Arterial-Calcium,Ionized--  Blood Urea Nitrogen, Serum 16 mg/dL [10 - 20]  Carbon Dioxide, Serum27 mmol/L [17 - 32]  Chloride, Serum93 mmol/L<L> [98 - 110]  Creatinie, Serum0.8 mg/dL [0.7 - 1.5]  Glucose, Ewpbo096 mg/dL<H> [70 - 99]  Potassium, Serum5.4 mmol/L<H> [3.5 - 5.0]  Sodium, Serum 131 mmol/L<L> [135 - 146]  [facility]  07-04-23 @ 05:25  Blood Gas Arterial-Calcium,Ionized--  Blood Urea Nitrogen, Serum 16 mg/dL [10 - 20]  Carbon Dioxide, Serum30 mmol/L [17 - 32]  Chloride, Serum94 mmol/L<L> [98 - 110]  Creatinie, Serum0.8 mg/dL [0.7 - 1.5]  Glucose, Kizns691 mg/dL<H> [70 - 99]  Potassium, Serum5.5 mmol/L<H> [3.5 - 5.0]  Sodium, Serum 134 mmol/L<L> [135 - 146]      Microbiology:    Culture - Sputum (collected 06-11-23 @ 11:47)  Source: Trach Asp Tracheal Aspirate  Gram Stain (06-11-23 @ 23:26):    Numerous polymorphonuclear leukocytes per low power field    Rare Squamous epithelial cells per low power field    No organisms seen per oil power field  Final Report (06-13-23 @ 16:58):    Normal Respiratory Laila present    Culture - Blood (collected 06-10-23 @ 11:41)  Source: .Blood None  Final Report (06-15-23 @ 20:01):    No Growth Final      Medications:  acetaminophen     Tablet .. 650 milliGRAM(s) Oral every 6 hours PRN  aluminum hydroxide/magnesium hydroxide/simethicone Suspension 30 milliLiter(s) Oral every 4 hours PRN  apixaban 5 milliGRAM(s) Oral every 12 hours  chlorhexidine 2% Cloths 1 Application(s) Topical <User Schedule>  clonazePAM  Tablet 0.5 milliGRAM(s) Oral three times a day PRN  diltiazem    Tablet 30 milliGRAM(s) Oral every 6 hours  famotidine    Tablet 20 milliGRAM(s) Oral daily  ferrous    sulfate Liquid 300 milliGRAM(s) Enteral Tube daily  metroNIDAZOLE    Tablet 500 milliGRAM(s) Oral every 8 hours  multivitamin 1 Tablet(s) Oral daily  nystatin Cream 1 Application(s) Topical two times a day  oxycodone    5 mG/acetaminophen 325 mG 1 Tablet(s) Oral/Enteral Tube every 6 hours PRN  petrolatum white Ointment 1 Application(s) Topical two times a day  senna 2 Tablet(s) Oral at bedtime  vancomycin  IVPB 500 milliGRAM(s) IV Intermittent every 12 hours  vitamin A &amp; D Ointment 1 Application(s) Topical daily        Assessment and Plan:  Patient is a 44 y/o female with PMH of Asthma, HTN c/w acute Influenza B viral pneumonia superimposed on cavitary MRSA pneumonia, acute hypoxic respiratory failure requiring intubation. Patient was initially admitted to ICU level of care, completed the course of zyvox and tamiflu.   Pneumonia was  complicated by loculated parapneumonic effusion s/p  chest tube placement/removal. s/p Tracheostomy and peg  placement   Patient's stay was complicated by MRSA bacteremia  currently on IV vancomycin and metronidazole, cultures negative to date. MASTER was negative for vegetations.   Her stay was also complicated by new onset Afib with RVR for which she is on amiodarone, diltiazem, and therapeutic anticoagulation.  Patient also had LANCE that improved with no need for RRT. Otherwise patient received a total of 6 p RBC for acute blood loss anemia ,currently hemoglobin stable. Patient transferred to step down unit and to Vent Unit with the following course of therapy:     Acute hypoxemic respiratory failure / s/P Trach and PEG    Severe cavitary CAP MRSA  Small parapneumonic effusion SP Chest tube- removed   MRSA bacteremia  Influenza B   hx of Asthma   Intubated 5/23 self-extubated 5/25, re-intubated 5/25; SBT failed, SP trach/G tube  - s/p Zyvox, meropenem and Tamifu, now on Vancomycin and flagyl, anticipated end date 7/9  - s/p course of steroids now off  -  blood cultures  NGTD SINCE 5/28   - s/p MASTER 5/31: No evidence of valvular vegetations or intracardiac abscesses to support IE. Pulmonary hepatisation incidental finding. EF 65%, mild TR and pulm HTN  - tapered off methadone( given for pain), d/c Zyprexa  -7/6/23: tapered  trach to collar, saturating 98%  -7/7/23 - 7/8/23 : patient tolerating well trach to collar. monitor for decreased secretions ,than will cap the trach, observe pt. clinically    # s/p peg placement-   -07/06/2023- patient was evaluated by speech tx. , post  video swallow on 7/7/23- started on diet.  -tolerating diet well, will d/c peg feedings      # Hyperkalemia- f/up BMP    # Microcytic Anemia likely  anemia of chronic disease   - s/p 6 units prbc so far last 6/5  - Restarted AC with lovenox 6/22 given Hb stable last few days, switched to Eliquis 5mg Q12H   -monitor hemoglobin   - daily iron tx, daily MVI tx.     New onset A-fib w/ RVR- currently remains in NSR with RBBB  - HR better controlled now  - s/p Amio drip, now on PO amiodarone, prolonged QT- d/c Amiodarone on 7/7/23 as per EP  - due to hypotension decrease the dose of Cardizem po  30mg via peg every 6 hrs   - therapeutic Lovenox,  transitioned to Eliquis 5mg Q12H    - EP specialist on board    Anxiety- continue low dose Klonopin 0.5 mg via peg every 8 hours prn. tx.    Physical deconditioning - post  PT eval- max assist, currently oob to chair.     GI/DVT proph.    #Progress Note Handoff: daily BMP monitoring , continue trach to collar , continue IV Vanco till 7/9/23,  continue Klonopin prn. for anxiety/agitation events, oob to chair activity, d/c peg feedings, monitor HR   Family discussion: medical plan of tx. d/w pt. by bedside Disposition: STR in 24 to 48 hours     Total time spent to complete patient's bedside assessment, review medical chart, discuss medical plan of care with covering medical team was more than 50 minutes with >50% of time spent face to face with patient, discussion with patient/family and/or coordination of care .      none

## 2023-07-17 ENCOUNTER — APPOINTMENT (OUTPATIENT)
Dept: ULTRASOUND IMAGING | Facility: CLINIC | Age: 73
End: 2023-07-17
Payer: MEDICARE

## 2023-07-17 ENCOUNTER — OUTPATIENT (OUTPATIENT)
Dept: OUTPATIENT SERVICES | Facility: HOSPITAL | Age: 73
LOS: 1 days | End: 2023-07-17
Payer: COMMERCIAL

## 2023-07-17 DIAGNOSIS — Z98.89 OTHER SPECIFIED POSTPROCEDURAL STATES: Chronic | ICD-10-CM

## 2023-07-17 DIAGNOSIS — Z98.49 CATARACT EXTRACTION STATUS, UNSPECIFIED EYE: Chronic | ICD-10-CM

## 2023-07-17 DIAGNOSIS — N43.3 HYDROCELE, UNSPECIFIED: ICD-10-CM

## 2023-07-17 PROCEDURE — 76870 US EXAM SCROTUM: CPT

## 2023-07-17 PROCEDURE — 76870 US EXAM SCROTUM: CPT | Mod: 26

## 2023-11-13 ENCOUNTER — APPOINTMENT (OUTPATIENT)
Dept: UROLOGY | Facility: CLINIC | Age: 73
End: 2023-11-13
Payer: MEDICARE

## 2023-11-13 VITALS
HEART RATE: 80 BPM | SYSTOLIC BLOOD PRESSURE: 127 MMHG | OXYGEN SATURATION: 100 % | TEMPERATURE: 97.9 F | DIASTOLIC BLOOD PRESSURE: 74 MMHG

## 2023-11-13 DIAGNOSIS — N43.3 HYDROCELE, UNSPECIFIED: ICD-10-CM

## 2023-11-13 PROCEDURE — 99214 OFFICE O/P EST MOD 30 MIN: CPT

## 2023-12-05 ENCOUNTER — APPOINTMENT (OUTPATIENT)
Dept: UROLOGY | Facility: CLINIC | Age: 73
End: 2023-12-05

## 2025-02-18 ENCOUNTER — INPATIENT (INPATIENT)
Facility: HOSPITAL | Age: 75
LOS: 1 days | Discharge: HOME | End: 2025-02-20
Attending: STUDENT IN AN ORGANIZED HEALTH CARE EDUCATION/TRAINING PROGRAM | Admitting: STUDENT IN AN ORGANIZED HEALTH CARE EDUCATION/TRAINING PROGRAM
Payer: MEDICARE

## 2025-02-18 VITALS
HEART RATE: 72 BPM | SYSTOLIC BLOOD PRESSURE: 173 MMHG | RESPIRATION RATE: 18 BRPM | OXYGEN SATURATION: 98 % | TEMPERATURE: 98 F | DIASTOLIC BLOOD PRESSURE: 89 MMHG

## 2025-02-18 DIAGNOSIS — Z98.89 OTHER SPECIFIED POSTPROCEDURAL STATES: Chronic | ICD-10-CM

## 2025-02-18 DIAGNOSIS — R26.81 UNSTEADINESS ON FEET: ICD-10-CM

## 2025-02-18 DIAGNOSIS — Z98.49 CATARACT EXTRACTION STATUS, UNSPECIFIED EYE: Chronic | ICD-10-CM

## 2025-02-18 LAB
ALBUMIN SERPL ELPH-MCNC: 4.2 G/DL — SIGNIFICANT CHANGE UP (ref 3.3–5)
ALP SERPL-CCNC: 99 U/L — SIGNIFICANT CHANGE UP (ref 40–120)
ALT FLD-CCNC: 26 U/L — SIGNIFICANT CHANGE UP (ref 4–41)
ANION GAP SERPL CALC-SCNC: 10 MMOL/L — SIGNIFICANT CHANGE UP (ref 7–14)
APPEARANCE UR: CLEAR — SIGNIFICANT CHANGE UP
APTT BLD: 30.3 SEC — SIGNIFICANT CHANGE UP (ref 24.5–35.6)
AST SERPL-CCNC: 25 U/L — SIGNIFICANT CHANGE UP (ref 4–40)
BACTERIA # UR AUTO: NEGATIVE /HPF — SIGNIFICANT CHANGE UP
BASOPHILS # BLD AUTO: 0.05 K/UL — SIGNIFICANT CHANGE UP (ref 0–0.2)
BASOPHILS NFR BLD AUTO: 0.8 % — SIGNIFICANT CHANGE UP (ref 0–2)
BILIRUB SERPL-MCNC: 0.2 MG/DL — SIGNIFICANT CHANGE UP (ref 0.2–1.2)
BILIRUB UR-MCNC: NEGATIVE — SIGNIFICANT CHANGE UP
BUN SERPL-MCNC: 26 MG/DL — HIGH (ref 7–23)
CALCIUM SERPL-MCNC: 9.2 MG/DL — SIGNIFICANT CHANGE UP (ref 8.4–10.5)
CAST: 0 /LPF — SIGNIFICANT CHANGE UP (ref 0–4)
CHLORIDE SERPL-SCNC: 107 MMOL/L — SIGNIFICANT CHANGE UP (ref 98–107)
CO2 SERPL-SCNC: 23 MMOL/L — SIGNIFICANT CHANGE UP (ref 22–31)
COLOR SPEC: YELLOW — SIGNIFICANT CHANGE UP
CREAT SERPL-MCNC: 1.66 MG/DL — HIGH (ref 0.5–1.3)
DIFF PNL FLD: NEGATIVE — SIGNIFICANT CHANGE UP
EGFR: 43 ML/MIN/1.73M2 — LOW
EOSINOPHIL # BLD AUTO: 0.26 K/UL — SIGNIFICANT CHANGE UP (ref 0–0.5)
EOSINOPHIL NFR BLD AUTO: 4.1 % — SIGNIFICANT CHANGE UP (ref 0–6)
FLUAV AG NPH QL: SIGNIFICANT CHANGE UP
FLUBV AG NPH QL: SIGNIFICANT CHANGE UP
GLUCOSE SERPL-MCNC: 83 MG/DL — SIGNIFICANT CHANGE UP (ref 70–99)
GLUCOSE UR QL: NEGATIVE MG/DL — SIGNIFICANT CHANGE UP
HCT VFR BLD CALC: 33.4 % — LOW (ref 39–50)
HGB BLD-MCNC: 11.5 G/DL — LOW (ref 13–17)
IANC: 3.19 K/UL — SIGNIFICANT CHANGE UP (ref 1.8–7.4)
IMM GRANULOCYTES NFR BLD AUTO: 0.5 % — SIGNIFICANT CHANGE UP (ref 0–0.9)
INR BLD: 0.9 RATIO — SIGNIFICANT CHANGE UP (ref 0.85–1.16)
KETONES UR-MCNC: NEGATIVE MG/DL — SIGNIFICANT CHANGE UP
LEUKOCYTE ESTERASE UR-ACNC: NEGATIVE — SIGNIFICANT CHANGE UP
LYMPHOCYTES # BLD AUTO: 2.32 K/UL — SIGNIFICANT CHANGE UP (ref 1–3.3)
LYMPHOCYTES # BLD AUTO: 36.4 % — SIGNIFICANT CHANGE UP (ref 13–44)
MAGNESIUM SERPL-MCNC: 2 MG/DL — SIGNIFICANT CHANGE UP (ref 1.6–2.6)
MCHC RBC-ENTMCNC: 31.9 PG — SIGNIFICANT CHANGE UP (ref 27–34)
MCHC RBC-ENTMCNC: 34.4 G/DL — SIGNIFICANT CHANGE UP (ref 32–36)
MCV RBC AUTO: 92.8 FL — SIGNIFICANT CHANGE UP (ref 80–100)
MONOCYTES # BLD AUTO: 0.53 K/UL — SIGNIFICANT CHANGE UP (ref 0–0.9)
MONOCYTES NFR BLD AUTO: 8.3 % — SIGNIFICANT CHANGE UP (ref 2–14)
NEUTROPHILS # BLD AUTO: 3.19 K/UL — SIGNIFICANT CHANGE UP (ref 1.8–7.4)
NEUTROPHILS NFR BLD AUTO: 49.9 % — SIGNIFICANT CHANGE UP (ref 43–77)
NITRITE UR-MCNC: NEGATIVE — SIGNIFICANT CHANGE UP
NRBC # BLD AUTO: 0 K/UL — SIGNIFICANT CHANGE UP (ref 0–0)
NRBC # FLD: 0 K/UL — SIGNIFICANT CHANGE UP (ref 0–0)
NRBC BLD AUTO-RTO: 0 /100 WBCS — SIGNIFICANT CHANGE UP (ref 0–0)
PH UR: 7.5 — SIGNIFICANT CHANGE UP (ref 5–8)
PHOSPHATE SERPL-MCNC: 2.4 MG/DL — LOW (ref 2.5–4.5)
PLATELET # BLD AUTO: 187 K/UL — SIGNIFICANT CHANGE UP (ref 150–400)
POTASSIUM SERPL-MCNC: 4 MMOL/L — SIGNIFICANT CHANGE UP (ref 3.5–5.3)
POTASSIUM SERPL-SCNC: 4 MMOL/L — SIGNIFICANT CHANGE UP (ref 3.5–5.3)
PROT SERPL-MCNC: 7.1 G/DL — SIGNIFICANT CHANGE UP (ref 6–8.3)
PROT UR-MCNC: 30 MG/DL
PROTHROM AB SERPL-ACNC: 10.5 SEC — SIGNIFICANT CHANGE UP (ref 9.9–13.4)
RBC # BLD: 3.6 M/UL — LOW (ref 4.2–5.8)
RBC # FLD: 12.6 % — SIGNIFICANT CHANGE UP (ref 10.3–14.5)
RBC CASTS # UR COMP ASSIST: 2 /HPF — SIGNIFICANT CHANGE UP (ref 0–4)
RSV RNA NPH QL NAA+NON-PROBE: SIGNIFICANT CHANGE UP
SARS-COV-2 RNA SPEC QL NAA+PROBE: SIGNIFICANT CHANGE UP
SODIUM SERPL-SCNC: 140 MMOL/L — SIGNIFICANT CHANGE UP (ref 135–145)
SP GR SPEC: 1.01 — SIGNIFICANT CHANGE UP (ref 1–1.03)
SQUAMOUS # UR AUTO: 0 /HPF — SIGNIFICANT CHANGE UP (ref 0–5)
UROBILINOGEN FLD QL: 0.2 MG/DL — SIGNIFICANT CHANGE UP (ref 0.2–1)
WBC # BLD: 6.38 K/UL — SIGNIFICANT CHANGE UP (ref 3.8–10.5)
WBC # FLD AUTO: 6.38 K/UL — SIGNIFICANT CHANGE UP (ref 3.8–10.5)
WBC UR QL: 0 /HPF — SIGNIFICANT CHANGE UP (ref 0–5)

## 2025-02-18 PROCEDURE — 99223 1ST HOSP IP/OBS HIGH 75: CPT

## 2025-02-18 PROCEDURE — 70450 CT HEAD/BRAIN W/O DYE: CPT | Mod: 26

## 2025-02-18 PROCEDURE — 99285 EMERGENCY DEPT VISIT HI MDM: CPT

## 2025-02-18 RX ORDER — MECLIZINE HCL 12.5 MG
12.5 TABLET ORAL ONCE
Refills: 0 | Status: COMPLETED | OUTPATIENT
Start: 2025-02-18 | End: 2025-02-18

## 2025-02-18 RX ORDER — MELATONIN 5 MG
3 TABLET ORAL AT BEDTIME
Refills: 0 | Status: DISCONTINUED | OUTPATIENT
Start: 2025-02-18 | End: 2025-02-20

## 2025-02-18 RX ORDER — SODIUM CHLORIDE 9 G/1000ML
1000 INJECTION, SOLUTION INTRAVENOUS
Refills: 0 | Status: DISCONTINUED | OUTPATIENT
Start: 2025-02-18 | End: 2025-02-20

## 2025-02-18 RX ORDER — DEXTROSE 50 % IN WATER 50 %
15 SYRINGE (ML) INTRAVENOUS ONCE
Refills: 0 | Status: DISCONTINUED | OUTPATIENT
Start: 2025-02-18 | End: 2025-02-20

## 2025-02-18 RX ORDER — DEXTROSE 50 % IN WATER 50 %
12.5 SYRINGE (ML) INTRAVENOUS ONCE
Refills: 0 | Status: DISCONTINUED | OUTPATIENT
Start: 2025-02-18 | End: 2025-02-20

## 2025-02-18 RX ORDER — GLUCAGON 3 MG/1
1 POWDER NASAL ONCE
Refills: 0 | Status: DISCONTINUED | OUTPATIENT
Start: 2025-02-18 | End: 2025-02-20

## 2025-02-18 RX ORDER — INSULIN LISPRO 100 U/ML
INJECTION, SOLUTION INTRAVENOUS; SUBCUTANEOUS AT BEDTIME
Refills: 0 | Status: DISCONTINUED | OUTPATIENT
Start: 2025-02-18 | End: 2025-02-20

## 2025-02-18 RX ORDER — ACETAMINOPHEN 500 MG/5ML
1000 LIQUID (ML) ORAL ONCE
Refills: 0 | Status: COMPLETED | OUTPATIENT
Start: 2025-02-18 | End: 2025-02-18

## 2025-02-18 RX ORDER — GABAPENTIN 400 MG/1
300 CAPSULE ORAL
Refills: 0 | Status: DISCONTINUED | OUTPATIENT
Start: 2025-02-18 | End: 2025-02-20

## 2025-02-18 RX ORDER — DEXTROSE 50 % IN WATER 50 %
25 SYRINGE (ML) INTRAVENOUS ONCE
Refills: 0 | Status: DISCONTINUED | OUTPATIENT
Start: 2025-02-18 | End: 2025-02-20

## 2025-02-18 RX ORDER — LIDOCAINE HYDROCHLORIDE 20 MG/ML
1 JELLY TOPICAL ONCE
Refills: 0 | Status: COMPLETED | OUTPATIENT
Start: 2025-02-18 | End: 2025-02-18

## 2025-02-18 RX ORDER — INSULIN LISPRO 100 U/ML
INJECTION, SOLUTION INTRAVENOUS; SUBCUTANEOUS
Refills: 0 | Status: DISCONTINUED | OUTPATIENT
Start: 2025-02-18 | End: 2025-02-20

## 2025-02-18 RX ORDER — ACETAMINOPHEN 500 MG/5ML
650 LIQUID (ML) ORAL EVERY 6 HOURS
Refills: 0 | Status: DISCONTINUED | OUTPATIENT
Start: 2025-02-18 | End: 2025-02-20

## 2025-02-18 RX ADMIN — LIDOCAINE HYDROCHLORIDE 1 PATCH: 20 JELLY TOPICAL at 18:30

## 2025-02-18 RX ADMIN — Medication 400 MILLIGRAM(S): at 18:30

## 2025-02-18 NOTE — H&P ADULT - NSHPPHYSICALEXAM_GEN_ALL_CORE
VITALS:   Vital Signs Last 24 Hrs  T(C): 37.2 (18 Feb 2025 21:12), Max: 37.2 (18 Feb 2025 21:12)  T(F): 98.9 (18 Feb 2025 21:12), Max: 98.9 (18 Feb 2025 21:12)  HR: 79 (18 Feb 2025 21:12) (72 - 79)  BP: 177/99 (18 Feb 2025 21:12) (173/89 - 177/99)  BP(mean): --  RR: 16 (18 Feb 2025 21:12) (16 - 18)  SpO2: 100% (18 Feb 2025 21:12) (98% - 100%)    Parameters below as of 18 Feb 2025 21:12  Patient On (Oxygen Delivery Method): room air    I&O's Summary    CAPILLARY BLOOD GLUCOSE    POCT Blood Glucose.: 110 mg/dL (18 Feb 2025 20:45)    Physical Exam:  General: NAD, non-toxic appearing   HEENT: PERRL, no scleral icterus  CV: RRR, normal S1 and S2  Lungs: normal respiratory effort on RA, CTAB  Abd: soft, nontender, nondistended  Ext: no edema, 2+ peripheral pulses   Pysch: AAOx3, appropriate affect   Neuro: grossly non-focal, moving all extremities spontaneously, BLE strength grossly 5/5, sensation grossly intact, gait not assessed due to fall risk   Skin: no rashes or lesions

## 2025-02-18 NOTE — H&P ADULT - PROBLEM SELECTOR PLAN 3
SCr 1.66 on admission, was ~1.3-1.4 in 2021, likely progression of CKD vs RAYMOND on CKD  - avoid nephrotoxic agents, renally dose medications  - continue to trend, monitor UOP CTH incidentally with evidence of chronic fungal sinusitis most notably involving the left frontal sinus and right   maxillary sinus  - outpatient follow up

## 2025-02-18 NOTE — ED PROVIDER NOTE - NSICDXFAMILYHX_GEN_ALL_CORE_FT
FAMILY HISTORY:  Family history of pancreatic cancer    Father  Still living? Unknown  Diabetes mellitus, type 2, Age at diagnosis: Age Unknown

## 2025-02-18 NOTE — CONSULT NOTE ADULT - ASSESSMENT
Assessment:  Patient NANCI POLO is a 75y (1950) with a PMHx significant for HTN, HLD, DM, is legally blind in both eyes, comes in due to episodes of imbalance and leg weakness which has been going on for 3 months but worsening for the last 3 days. When he is standing or walking, sometimes, his legs feel weak and he feels he is about to fall. It has been increasing in frequency for the last 3 weeks, getting particularly worse in last 3 days, upto 10 times a day. Because of these episodes he has been having feeling of imbalance and difficult ambulating. He also has associated pain shooting from L hip to knee and sometimes down the shin for the last 3 months. For the last 3 days he has been having same sensation in the right leg (pain shooting from hip to knee, sometimes to the shin). Symptoms don't occur when he is lying down. Denies back pain, urinary or bowel incontinence (although has increased bladder frequency for last few days without burning micturition). He reports having an MRI spine done outpatient(not in the system), which showed spinal stenosis. Most bothersome symptoms are leg weakness, imbalance and pain. Denies any weakness, numbness in arms, no speech changes. No hx of back injury. On exam, mild LLE KE weakness, no numbness, no loss of proprioception, reflexes decreased distally. CTH pending.     Impression: Chronic intermittent b/l LE weakness, neuropathic pain could be from b/l radiculopathy or neuropathy    Recs:  [] f/u CTH  [] obtain outpatient records of MRI spine  [] if not available, might require MRI L spine wo contrast   [] ESR, CRP, ACE, IL2, CMP, LFT, TSH, Free T3/T4, A1C, Vitamin B1, B6, B12, D(25-OH), Folate, Vitamin E,   [] consider gabapentin 100mg TID for neuropathic pain  [] PTOT evaluation    Case to be discussed with attending Assessment:  Patient NANCI POLO is a 75y (1950) with a PMHx significant for HTN, HLD, DM, is legally blind in both eyes, comes in due to episodes of imbalance and leg weakness which has been going on for 3 months but worsening for the last 3 days. When he is standing or walking, sometimes, his legs feel weak and he feels he is about to fall. It has been increasing in frequency for the last 3 weeks, getting particularly worse in last 3 days, upto 10 times a day. Because of these episodes he has been having feeling of imbalance and difficult ambulating. He also has associated pain shooting from L hip to knee and sometimes down the shin for the last 3 months. For the last 3 days he has been having same sensation in the right leg (pain shooting from hip to knee, sometimes to the shin). Symptoms don't occur when he is lying down. Denies back pain, urinary or bowel incontinence (although has increased bladder frequency for last few days without burning micturition). He reports having an MRI spine done outpatient(not in the system), which showed spinal stenosis. Most bothersome symptoms are leg weakness, imbalance and pain. Denies any weakness, numbness in arms, no speech changes. No hx of back injury. On exam, mild LLE KE weakness, no numbness, no loss of proprioception, reflexes decreased distally. CTH pending.     Impression: Chronic intermittent b/l LE weakness, neuropathic pain could be from b/l radiculopathy or neuropathy    Recs:  [] f/u CTH  [] obtain outpatient records of MRI spine  [] if not available, might require MRI L spine wo contrast, inpatient or outpatient  [] if the patient wants to stay, consider consulting spine surgery based on the MRI  [] if patient opting to leave, consider spine surgery referral  [] ESR, CRP, ACE, IL2, CMP, LFT, TSH, Free T3/T4, A1C, Vitamin B1, B6, B12, D(25-OH), Folate, Vitamin E,   [] consider gabapentin 300mg BID for neuropathic pain  [] PTOT evaluation    Case discussed with attending Dr. Lopez Assessment:  Patient NANCI POLO is a 75y (1950) with a PMHx significant for HTN, HLD, DM, is legally blind in both eyes, comes in due to episodes of imbalance and leg weakness which has been going on for 3 months but worsening for the last 3 days. When he is standing or walking, sometimes, his legs feel weak and he feels he is about to fall. It has been increasing in frequency for the last 3 weeks, getting particularly worse in last 3 days, upto 10 times a day. Because of these episodes he has been having feeling of imbalance and difficult ambulating. He also has associated pain shooting from L hip to knee and sometimes down the shin for the last 3 months. For the last 3 days he has been having same sensation in the right leg (pain shooting from hip to knee, sometimes to the shin). Symptoms don't occur when he is lying down. Denies back pain, urinary or bowel incontinence (although has increased bladder frequency for last few days without burning micturition). He reports having an MRI spine done outpatient(not in the system), which showed spinal stenosis. Most bothersome symptoms are leg weakness, imbalance and pain. Denies any weakness, numbness in arms, no speech changes. No hx of back injury. Denies any falls. On exam, mild LLE KE weakness, no numbness, no loss of proprioception, reflexes decreased distally. CTH pending.     Impression: Chronic intermittent b/l LE weakness, neuropathic pain could be from b/l radiculopathy or neuropathy    Recs:  [] f/u CTH  [] obtain outpatient records of MRI spine  [] if not available, might require MRI L spine wo contrast, inpatient or outpatient  [] if the patient wants to stay, consider consulting spine surgery based on the MRI  [] if patient opting to leave, consider spine surgery referral  [] ESR, CRP, ACE, IL2, CMP, LFT, TSH, Free T3/T4, A1C, Vitamin B1, B6, B12, D(25-OH), Folate, Vitamin E,   [] consider gabapentin 300mg BID for neuropathic pain  [] PTOT evaluation    Case discussed with attending Dr. Lopez Assessment:  Patient NANCI POLO is a 75y (1950) with a PMHx significant for HTN, HLD, DM, is legally blind in both eyes, comes in due to episodes of imbalance and leg weakness which has been going on for 3 months but worsening for the last 3 days. When he is standing or walking, sometimes, his legs feel weak and he feels he is about to fall. It has been increasing in frequency for the last 3 weeks, getting particularly worse in last 3 days, upto 10 times a day. Because of these episodes he has been having feeling of imbalance and difficult ambulating. He also has associated pain shooting from L hip to knee and sometimes down the shin for the last 3 months. For the last 3 days he has been having same sensation in the right leg (pain shooting from hip to knee, sometimes to the shin). Symptoms don't occur when he is lying down. Denies back pain, urinary or bowel incontinence (although has increased bladder frequency for last few days without burning micturition). He reports having an MRI spine done outpatient(not in the system), which showed spinal stenosis. Most bothersome symptoms are leg weakness, imbalance and pain. Denies any weakness, numbness in arms, no speech changes. No hx of back injury. Denies any falls. On exam, mild LLE KE weakness, no numbness, no loss of proprioception, reflexes decreased distally. CTH pending.     Impression: Chronic intermittent b/l LE weakness, neuropathic pain could be from b/l radiculopathy or neuropathy    Recs:  [] f/u CTH  [] obtain outpatient records of MRI spine  [] if not available, might require MRI L spine wo contrast, CDU/inpatient  [] if the patient wants to stay, consider consulting spine surgery based on the MRI  [] if patient opting to leave, consider spine surgery referral  [] ESR, CRP, ACE, IL2, CMP, LFT, TSH, Free T3/T4, A1C, Vitamin B1, B6, B12, D(25-OH), Folate, Vitamin E,   [] consider gabapentin 300mg BID for neuropathic pain  [] PTOT evaluation    Case discussed with attending Dr. Lopez Assessment:  Patient NANCI POLO is a 75y (1950) with a PMHx significant for HTN, HLD, DM, is legally blind in both eyes, comes in due to episodes of imbalance and leg weakness which has been going on for 3 months but worsening for the last 3 days. When he is standing or walking, sometimes, his legs feel weak and he feels he is about to fall. It has been increasing in frequency for the last 3 weeks, getting particularly worse in last 3 days, upto 10 times a day. Because of these episodes he has been having feeling of imbalance and difficult ambulating. He also has associated pain shooting from L hip to knee and sometimes down the shin for the last 3 months. For the last 3 days he has been having same sensation in the right leg (pain shooting from hip to knee, sometimes to the shin). Symptoms don't occur when he is lying down. Denies back pain, urinary or bowel incontinence (although has increased bladder frequency for last few days without burning micturition). He reports having an MRI spine done outpatient(not in the system), which showed spinal stenosis. Most bothersome symptoms are leg weakness, imbalance and pain. Denies any weakness, numbness in arms, no speech changes. No hx of back injury. Denies any falls. On exam, mild LLE KF weakness, no numbness, no loss of proprioception, reflexes decreased distally. CTH pending.     Impression: Chronic intermittent b/l LE weakness, neuropathic pain could be from b/l radiculopathy or neuropathy    Recs:  [] f/u CTH  [] obtain outpatient records of MRI spine  [] if not available, might require MRI L spine wo contrast, CDU/inpatient  [] if the patient wants to stay, consider consulting spine surgery based on the MRI  [] if patient opting to leave, consider spine surgery referral  [] ESR, CRP, ACE, IL2, CMP, LFT, TSH, Free T3/T4, A1C, Vitamin B1, B6, B12, D(25-OH), Folate, Vitamin E,   [] consider gabapentin 300mg BID for neuropathic pain  [] PTOT evaluation    Case discussed with attending Dr. Lopez Assessment:  Patient NANCI POLO is a 75y (1950) with a PMHx significant for HTN, HLD, DM, is legally blind in both eyes, comes in due to episodes of imbalance and leg weakness which has been going on for 3 months but worsening for the last 3 days. When he is standing or walking, sometimes, his legs feel weak and he feels he is about to fall. It has been increasing in frequency for the last 3 weeks, getting particularly worse in last 3 days, upto 10 times a day. Because of these episodes he has been having feeling of imbalance and difficult ambulating. He also has associated pain shooting from L hip to knee and sometimes down the shin for the last 3 months. For the last 3 days he has been having same sensation in the right leg (pain shooting from hip to knee, sometimes to the shin). Symptoms don't occur when he is lying down. Denies back pain, urinary or bowel incontinence (although has increased bladder frequency for last few days without burning micturition). He reports having an MRI spine done outpatient(not in the system), which showed spinal stenosis. Most bothersome symptoms are leg weakness, imbalance and pain. Denies any weakness, numbness in arms, no speech changes. No hx of back injury. Denies any falls. On exam, mild LLE KF weakness, no numbness, no loss of proprioception, reflexes decreased distally. CTH pending.     Impression: Chronic intermittent b/l LE weakness, neuropathic pain could be from b/l radiculopathy or neuropathy    Recs:  [] f/u CTH  [] obtain outpatient records of MRI spine  [] if the patient wants to stay, consider consulting spine surgery based on the MRI  [] if patient opting to leave, consider spine surgery referral  [] ESR, CRP, ACE, IL2, CMP, LFT, TSH, Free T3/T4, A1C, Vitamin B1, B6, B12, D(25-OH), Folate, Vitamin E,   [] consider gabapentin 300mg BID for neuropathic pain  [] PTOT evaluation    Case discussed with attending Dr. Lopez

## 2025-02-18 NOTE — H&P ADULT - NSHPLABSRESULTS_GEN_ALL_CORE
.  LABS:                         11.5   6.38  )-----------( 187      ( 2025 17:28 )             33.4     02-18    140  |  107  |  26[H]  ----------------------------<  83  4.0   |  23  |  1.66[H]    Ca    9.2      2025 17:28  Phos  2.4     02-18  Mg     2.00     02-18    TPro  7.1  /  Alb  4.2  /  TBili  0.2  /  DBili  x   /  AST  25  /  ALT  26  /  AlkPhos  99  02-18    PT/INR - ( 2025 17:28 )   PT: 10.5 sec;   INR: 0.90 ratio    PTT - ( 2025 17:28 )  PTT:30.3 sec  Urinalysis Basic - ( 2025 22:27 )    Color: Yellow / Appearance: Clear / S.012 / pH: x  Gluc: x / Ketone: Negative mg/dL  / Bili: Negative / Urobili: 0.2 mg/dL   Blood: x / Protein: 30 mg/dL / Nitrite: Negative   Leuk Esterase: Negative / RBC: 2 /HPF / WBC 0 /HPF   Sq Epi: x / Non Sq Epi: 0 /HPF / Bacteria: Negative /HPF    RADIOLOGY, EKG & ADDITIONAL TESTS: Reviewed.

## 2025-02-18 NOTE — ED ADULT NURSE REASSESSMENT NOTE - RESPIRATORY RATE (BREATHS/MIN)
[facility]ATORY ENCOUNTER  URGENT CARE VISIT      SUBJECTIVE:  Michelle Nye is a 61 year old female current smoker who presented to Urgent Care requesting evaluation for productive cough with yellow sputum, sensation of \"stuffy\" head, pharyngitis, an isolated episode of palpitations recently, dyspnea, lightheadedness, tactile fevers, and intermittent nausea.     Symptoms have been occurring to some extent, over the past 5 days.    She states that coworkers have had cold-like symptoms over the past 2 weeks at least.    She has taken over-the-counter cold and flu remedies without relief, but helps her to fall asleep.    Moderate sore throat currently noted, and is predictably brought on with swallowing.    Cough is worse at night and feels as if it is secondary to a tickle in the throat.    Two days ago when working, and while exerting herself at work, she reports having had at least 1 hour of palpations, described like fast heart rate, with associated dyspnea. Symptoms improved with rest, but has not since recurred.        REVIEW OF SYSTEMS:    Review of Systems   Constitutional: Positive for chills, fever and malaise/fatigue.   HENT: Positive for congestion. Negative for ear pain, sinus pain and sore throat.    Eyes: Negative for redness.   Respiratory: Positive for cough and sputum production. Negative for hemoptysis, shortness of breath and wheezing.    Cardiovascular: Positive for palpitations. Negative for chest pain.   Gastrointestinal: Positive for nausea. Negative for abdominal pain, constipation, diarrhea and vomiting.   Musculoskeletal: Positive for myalgias. Negative for neck pain.   Skin: Negative for rash.   Neurological: Positive for weakness. Negative for dizziness, focal weakness and headaches.   Endo/Heme/Allergies: Negative for environmental allergies.          PAST HISTORIES:   History reviewed. No pertinent past medical history.  History reviewed. No pertinent surgical history.  Current  Outpatient Medications   Medication Sig   • amoxicillin (AMOXIL) 500 MG capsule Take 1 capsule by mouth 2 times daily for 10 days.     No current facility-administered medications for this visit.            I have reviewed the past medical history, family history, social history, medications and allergies listed in the medical record as obtained by my nursing staff and support staff and agree with their documentation.        OBJECTIVE:    VITAL SIGNS:  Visit Vitals  /70   Pulse 65   Temp 98 °F (36.7 °C) (Temporal)   Resp 16   Wt 71.3 kg   SpO2 99%   Breastfeeding? No   BMI 25.18 kg/m²            PHYSICAL EXAM:   Physical Exam   Constitutional: She is well-developed, well-nourished, and in no distress. She appears not malnourished and not dehydrated. She appears healthy.  Non-toxic appearance. She does not have a sickly appearance. No distress.   HENT:   Head: Normocephalic.   Right Ear: External ear normal. No drainage or swelling. No mastoid tenderness. Tympanic membrane is not perforated, not erythematous, not retracted and not bulging. No middle ear effusion. No hemotympanum.   Left Ear: External ear normal. No drainage or swelling. No mastoid tenderness. Tympanic membrane is not perforated, not erythematous, not retracted and not bulging.  No middle ear effusion. No hemotympanum.   Nose: Nose normal. No rhinorrhea.   Mouth/Throat: Uvula is midline, oropharynx is clear and moist and mucous membranes are normal. No oral lesions. No uvula swelling. No oropharyngeal exudate, posterior oropharyngeal edema, posterior oropharyngeal erythema or tonsillar abscesses.   Eyes: Conjunctivae are normal. Right eye exhibits no discharge. Left eye exhibits no discharge.   Neck: Normal range of motion, full passive range of motion without pain and phonation normal. Neck supple. No neck rigidity. No erythema and normal range of motion present.   Cardiovascular: Normal rate, regular rhythm and intact distal pulses.   No  murmur heard.  Pulmonary/Chest: Effort normal and breath sounds normal. No accessory muscle usage or stridor. No tachypnea. No respiratory distress. She has no decreased breath sounds. She has no wheezes. She has no rhonchi. She has no rales.   Lymphadenopathy:     She has no cervical adenopathy.   Neurological: She is alert.   Skin: Skin is warm and dry. No rash noted. She is not diaphoretic. No pallor.   Nursing note and vitals reviewed.               DIAGNOSTICS:  LABS: Negative rapid strep      URGENT CARE COURSE:   With regard to sore throat, the patient is handling secretions without difficulty. There was no evidence of a facial or neck soft tissue abscess. There were no symptoms which were consistent with Gordo's angina or viral or bacterial meningitis. A rapid strep test was performed today and was negative. With a negative rapid strep test, a virus is most likely causing the symptoms.  Given duration of sore throat, and antibiotic was prescribed, so that she has the option to take it if symptoms further persist or become worse.    She also has had multiple flulike symptoms which most likely represent a viral illness. It is reassuring that she is hemodynamically stable, and overall well-appearing. No further workup was indicated.    With regard to lightheadedness, she is neurovascularly intact, not currently having lightheadedness. I suspect that lightheadedness could be secondary to a viral illness as well.      PROCEDURES: None.      ASSESSMENT:  1. Pharyngitis due to other organism    2. Viral syndrome              PLAN:  · Start the antibiotic if worsening of sore throat, or if no improvement by Friday.  · Stay well hydrated.  · If needed for pain, consider taking Tylenol 1000 mg at the same time as 400 mg of ibuprofen every 4-6 hours.   Do not take more than 4000 mg of Tylenol in 24 hours.   Do not take more than 3200 mg of ibuprofen in 24 hours.  · Alert a healthcare provider if you have difficulty  swallowing, uncontrolled fevers, vomiting, or any other new, concerning symptoms.  · Alert your regular provider if your symptoms do not improve at all, within the next 3 days.        Michelle expressed understanding of the diagnosis and agreed with the plan of care.    My supervising physician for this case is Dr. Merritt Austin, who did not examine the patient.    16

## 2025-02-18 NOTE — ED PROVIDER NOTE - CLINICAL SUMMARY MEDICAL DECISION MAKING FREE TEXT BOX
DO David, EM Attendin-year-old male with a past medical history of insulin-dependent diabetes, legally blind,  HTN, HLD presenting due to concern of unsteady gait, also experiencing sciatica type symptoms. Patient with known spinal stenosis, would likely be contributing to the pain he is experiencing.  However this new unsteady gait, given patient's blind and lives alone would want to get CT imaging, labs, electrolytes, check urinalysis, viral swab.  Would also likely involve neurology possible PT eval.  Patient does have some resources at home but states that worsening of his gait he has not yet addressed with his home PT. Pt would prefer to go home but given he is blind if he is having sensory/perception deficits it may not be a safe disposition.

## 2025-02-18 NOTE — H&P ADULT - PROBLEM SELECTOR PLAN 6
Diet: DASH/CC    DVT ppx:  heparin SQ  Dispo: pending clinical course - continue home atorvastatin 40mg qhs

## 2025-02-18 NOTE — H&P ADULT - HISTORY OF PRESENT ILLNESS
75M with history of psoriatic arthritis, HTN, HLD, T2DM c/b diabetic retinopathy, glaucoma, and legally blind in both eyes presenting with acute on chronic bilateral leg weakness and gait instability over the past 6 months, worsening over the past few days. Pt states that sometimes when he walks or even just standing, his legs would go weak, and feel as if he'll fall. Getting more frequent, happening up to 10 times/day over the past few days. With associated pain shooting from hip down to knee or shin, mostly left side, occasionally right side. Intermittent numbness/tingling. No bowel or urinary incontinence, falls, trauma to area.   Had brought it up to his rheumatologist and was referred to neurology, which he did not see yet. Did see pain management a month ago and had a MRI performed that reportedly showed spinal stenosis (not available on [facility]), was unaware of any next steps, and that pain doctor no longer accepts his insurance so was unable to return. Has been doing physical therapy for this 3x a week for a couple of months now. Pt lives alone with a HHA that comes in around 8a-2pm every day. No actual falls but daughter worried given the worsening symptoms and advised him to come to the hospital.     Has a list of medications printed from last month but states that multiple medications on that has since by discontinued for unclear reasons and cannot recall by who.     In ED, hypertensive with SBPs to 170s. Labs notable for mild hypophosphatemia to 2.4 and elevated SCr of 1.66, unclear if true RAYMOND vs CKD. CTH without acute intracranial pathology, with chronic paranasal sinus disease. UA negative for infection. Flu/covid/rsv negative. Received IV acetaminophen, meclizine 12.6mg PO, and lidocaine patch.  Was evaluated by neurology, recommending obtaining outpatient MRI records vs repeat MRI. Was not a CDU candidate given inability to ambulate. Admitted to medicine for further evaluation.

## 2025-02-18 NOTE — H&P ADULT - PROBLEM SELECTOR PLAN 1
dx'ed with lumbar stenosis via outpatient MRI 1 month prior ordered by outpatient pain management, without alarm symptoms, worsening radiculopathy and weakness over the past several days   - obtain outpatient MRI records, pt is a poor historian, does not recall name of doctor of facility, requesting to get repeat MRI scans in hospital  - if cannot obtain, can get inpatient, MRI non-con ordered   - start gabapentin 300mg BID for neuropathic pain   - noted to be previously on cymbalta and flexeril, both since discontinued per pt, unsure why or by who  - lidocaine patch   - already undergoing PT outpatient 3x a week, can get PT eval here dx'ed with lumbar stenosis via outpatient MRI 1 month prior ordered by outpatient pain management, without alarm symptoms, worsening radiculopathy and weakness over the past several days   - evaluated by neuro in ED, recs appreciated. lab work ordered for AM   - obtain outpatient MRI records, pt is a poor historian, does not recall name of doctor of facility, requesting to get repeat MRI scans in hospital  - if cannot obtain, can get inpatient, MRI non-con ordered   - start gabapentin 300mg BID for neuropathic pain   - noted to be previously on cymbalta and flexeril, both since discontinued per pt, unsure why or by who  - lidocaine patch   - already undergoing PT outpatient 3x a week, can get PT eval here

## 2025-02-18 NOTE — ED ADULT TRIAGE NOTE - CHIEF COMPLAINT QUOTE
Patient brought to Er by EMS from home for c/o bilateral leg pain and numbness as well as dizziness. Right leg pain has been going on three days ago and left leg pain and numbness has been going on for a long while. Patient is legally blind due to glaucoma. Type 2 DM: [id number] Patient brought to Er by EMS from home for c/o bilateral leg pain and numbness as well as dizziness. Right leg pain has been going on three days ago and left leg pain and numbness has been going on for a long while. Increased shortness of breath at night. Patient is legally blind due to glaucoma. Type 2 DM: [id number]

## 2025-02-18 NOTE — CONSULT NOTE ADULT - ATTENDING COMMENTS
Shooting pain down B legs.  Stinging and electrical pain in the thighs, not in the lower leg or feet.  Unclear if pain is worse with walking or standing.  May be worse when resting, lying down.   When he is standing he has a sensation of feeling "off" for a few seconds.   He had an MRI LS spine and was told that he had spinal stenosis.   Exam:  Motor:  5/5 throughout except for the right HF 4+.  He does hae arthritis pain.   Reflexes:   BR 0  Biceps, triceps, knees 1+  Ankles 2+  Toes mute.     A/P  Mr. Robles is a 74 yo man with B LE radicular pain who was told that he had spinal stenosis on a recent MRI LS spine.   All work up and treatment can be done as outpatient - spine service consult, pain management consult, PT.  Gabapentin 300 mg BID - if pain is worse at night then give second dose QHS. Watch for dizziness and sleepiness.   Neurology signing off. Please reconsult PRN or call [facility] [phone] with any questions.   D/W patient and he agrees with plan.   Thank you

## 2025-02-18 NOTE — CONSULT NOTE ADULT - SUBJECTIVE AND OBJECTIVE BOX
Neurology - Consult Note    -  Spectra: [phone] ([facility]), [phone] ([facility])  -    HPI: Patient NANCI POLO is a 75y (1950) with a PMHx significant for HTN, HLD, DM, is legally blind in both eyes, comes in due to episodes of imbalance and leg weakness which has been going on for 3 months but worsening for the last 3 days. When he is standing or walking, sometimes, his legs feel weak and he feels he is about to fall. It has been increasing in frequency for the last 3 weeks, getting particularly worse in last 3 days, upto 10 times a day. Because of these episodes he has been having feeling of imbalance and difficult ambulating. He also has associated pain shooting from L hip to knee and sometimes down the shin for the last 3 months. For the last 3 days he has been having same sensation in the right leg (pain shooting from hip to knee, sometimes to the shin). Denies back pain, urinary or bowel incontinence (although has increased bladder frequency for last few days without burning micturition). He reports having an MRI spine done outpatient(not in the system), which showed spinal stenosis. Most bothersome symptoms are leg weakness, imbalance and pain. Denies any weakness, numbness in arms, no speech changes. No hx of back injury.     All other review of systems is negative unless indicated above.    Allergies:  avocado (Swelling)  No Known Drug Allergies      PMHx/PSHx/Family Hx: As above, otherwise see below   Hypertension    Diabetes    Hyperlipidemia    Cataract    Glaucoma        Social Hx:  No current use of tobacco, alcohol, or illicit drugs    Medications:  MEDICATIONS  (STANDING):    MEDICATIONS  (PRN):      --------------------------------------------------------------------------------------------------------------------------------------------------------------------------------------------------------------------    Vitals:  T(C): 36.6 (02-18-25 @ 16:11), Max: 36.6 (02-18-25 @ 16:11)  HR: 72 (02-18-25 @ 16:11) (72 - 72)  BP: 173/89 (02-18-25 @ 16:11) (173/89 - 173/89)  RR: 18 (02-18-25 @ 16:11) (18 - 18)  SpO2: 98% (02-18-25 @ 16:11) (98% - 98%)    PHYSICAL EXAM:     General - NAD, legally blind  Cardiovascular - Peripheral pulses palpable, no edema    NEURO:    Mental status - Awake, Alert, Oriented to person, place, and time. Speech fluent, repetition and naming intact. Follows simple and complex commands. Attention/concentration, and fund of knowledge intact.    Cranial nerves -   pupils non reactive, unable to check VF, EOMI,   face sensation (V1-V3) intact b/l,   facial strength intact without asymmetry b/l,   hearing intact b/l,   trapezius 5/5 strength b/l,   tongue midline on protrusion with full lateral movement    Motor - Normal bulk and tone throughout. No pronator drift.  Strength testing            Deltoid      Biceps      Triceps     Wrist Extension    Wrist Flexion     Interossei         R            5                 5               5                     5                              5                        5                 5  L             5                 5               5                     5                              5                        5                 5              Hip Flexion    Hip Extension    Knee Flexion    Knee Extension    Dorsiflexion    Plantar Flexion  R              5                           5                       5                           5                            5                          5  L              5                           5                        4+                           5                            5                          5    Sensation - Light touch intact throughout, proprioception intact in b/l LE    straight leg test negative b/l    DTR's -             Biceps      Triceps     Brachioradialis      Patellar    Ankle    Toes/plantar response  R             1+             1+                  0+                       0+            0+                 Down  L              1+             1+                 0+                        0+           0+                 Down    Coordination - unable to assess    Gait and station - unable to assess    ---------------------------------------------------------------------------------------------------------------------------------------------------------------------------------------------------------------------------    Labs:                        11.5   6.38  )-----------( 187      ( 18 Feb 2025 17:28 )             33.4     02-18    140  |  107  |  26[H]  ----------------------------<  83  4.0   |  23  |  1.66[H]    Ca    9.2      18 Feb 2025 17:28  Phos  2.4     02-18  Mg     2.00     02-18    TPro  7.1  /  Alb  4.2  /  TBili  0.2  /  DBili  x   /  AST  25  /  ALT  26  /  AlkPhos  99  02-18    CAPILLARY BLOOD GLUCOSE      POCT Blood Glucose.: 105 mg/dL (18 Feb 2025 16:18)    LIVER FUNCTIONS - ( 18 Feb 2025 17:28 )  Alb: 4.2 g/dL / Pro: 7.1 g/dL / ALK PHOS: 99 U/L / ALT: 26 U/L / AST: 25 U/L / GGT: x             PT/INR - ( 18 Feb 2025 17:28 )   PT: 10.5 sec;   INR: 0.90 ratio         PTT - ( 18 Feb 2025 17:28 )  PTT:30.3 sec  CSF:                  Radiology:     Neurology - Consult Note    -  Spectra: [phone] ([facility]), [phone] ([facility])  -    HPI: Patient NANCI POLO is a 75y (1950) with a PMHx significant for HTN, HLD, DM, is legally blind in both eyes, comes in due to episodes of imbalance and leg weakness which has been going on for 3 months but worsening for the last 3 days. When he is standing or walking, sometimes, his legs feel weak and he feels he is about to fall. It has been increasing in frequency for the last 3 weeks, getting particularly worse in last 3 days, upto 10 times a day. Because of these episodes he has been having feeling of imbalance and difficult ambulating. He also has associated pain shooting from L hip to knee and sometimes down the shin for the last 3 months. For the last 3 days he has been having same sensation in the right leg (pain shooting from hip to knee, sometimes to the shin). Denies back pain, urinary or bowel incontinence (although has increased bladder frequency for last few days without burning micturition). He reports having an MRI spine done outpatient(not in the system), which showed spinal stenosis. Most bothersome symptoms are leg weakness, imbalance and pain. Denies any weakness, numbness in arms, no speech changes. No hx of back injury. Denies any falls.     All other review of systems is negative unless indicated above.    Allergies:  avocado (Swelling)  No Known Drug Allergies      PMHx/PSHx/Family Hx: As above, otherwise see below   Hypertension    Diabetes    Hyperlipidemia    Cataract    Glaucoma        Social Hx:  No current use of tobacco, alcohol, or illicit drugs    Medications:  MEDICATIONS  (STANDING):    MEDICATIONS  (PRN):      --------------------------------------------------------------------------------------------------------------------------------------------------------------------------------------------------------------------    Vitals:  T(C): 36.6 (02-18-25 @ 16:11), Max: 36.6 (02-18-25 @ 16:11)  HR: 72 (02-18-25 @ 16:11) (72 - 72)  BP: 173/89 (02-18-25 @ 16:11) (173/89 - 173/89)  RR: 18 (02-18-25 @ 16:11) (18 - 18)  SpO2: 98% (02-18-25 @ 16:11) (98% - 98%)    PHYSICAL EXAM:     General - NAD, legally blind  Cardiovascular - Peripheral pulses palpable, no edema    NEURO:    Mental status - Awake, Alert, Oriented to person, place, and time. Speech fluent, repetition and naming intact. Follows simple and complex commands. Attention/concentration, and fund of knowledge intact.    Cranial nerves -   pupils non reactive, unable to check VF, EOMI,   face sensation (V1-V3) intact b/l,   facial strength intact without asymmetry b/l,   hearing intact b/l,   trapezius 5/5 strength b/l,   tongue midline on protrusion with full lateral movement    Motor - Normal bulk and tone throughout. No pronator drift.  Strength testing            Deltoid      Biceps      Triceps     Wrist Extension    Wrist Flexion     Interossei         R            5                 5               5                     5                              5                        5                 5  L             5                 5               5                     5                              5                        5                 5              Hip Flexion    Hip Extension    Knee Flexion    Knee Extension    Dorsiflexion    Plantar Flexion  R              5                           5                       5                           5                            5                          5  L              5                           5                        4+                           5                            5                          5    Sensation - Light touch intact throughout, proprioception intact in b/l LE    straight leg test negative b/l    DTR's -             Biceps      Triceps     Brachioradialis      Patellar    Ankle    Toes/plantar response  R             1+             1+                  0+                       0+            0+                 Down  L              1+             1+                 0+                        0+           0+                 Down    Coordination - unable to assess    Gait and station - unable to assess    ---------------------------------------------------------------------------------------------------------------------------------------------------------------------------------------------------------------------------    Labs:                        11.5   6.38  )-----------( 187      ( 18 Feb 2025 17:28 )             33.4     02-18    140  |  107  |  26[H]  ----------------------------<  83  4.0   |  23  |  1.66[H]    Ca    9.2      18 Feb 2025 17:28  Phos  2.4     02-18  Mg     2.00     02-18    TPro  7.1  /  Alb  4.2  /  TBili  0.2  /  DBili  x   /  AST  25  /  ALT  26  /  AlkPhos  99  02-18    CAPILLARY BLOOD GLUCOSE      POCT Blood Glucose.: 105 mg/dL (18 Feb 2025 16:18)    LIVER FUNCTIONS - ( 18 Feb 2025 17:28 )  Alb: 4.2 g/dL / Pro: 7.1 g/dL / ALK PHOS: 99 U/L / ALT: 26 U/L / AST: 25 U/L / GGT: x             PT/INR - ( 18 Feb 2025 17:28 )   PT: 10.5 sec;   INR: 0.90 ratio         PTT - ( 18 Feb 2025 17:28 )  PTT:30.3 sec  CSF:                  Radiology:

## 2025-02-18 NOTE — H&P ADULT - ASSESSMENT
75M with history of psoriatic arthritis on Cosentyx monthly, HTN, HLD, ?CLD, knee OA, T2DM c/b diabetic retinopathy, glaucoma, and legally blind in both eyes presenting with acute on chronic bilateral lower extremity weakness, ongoing for past 6 months, worsening for past several days. Reportedly with known lumbar spinal stenosis, undergoing outpatient physical therapy. Presenting due to safety concern given living alone and difficulty obtaining outpatient follow up.

## 2025-02-18 NOTE — H&P ADULT - PROBLEM SELECTOR PLAN 4
legally blind in both eyes, follows with outpatient ophthalmology   - continue home eye gtts SCr 1.66 on admission, was ~1.3-1.4 in 2021, likely progression of CKD vs RAYMOND on CKD  - avoid nephrotoxic agents, renally dose medications  - continue to trend, monitor UOP

## 2025-02-18 NOTE — ED PROVIDER NOTE - PHYSICAL EXAMINATION
DO David, EM Attending: General: well-appearing, no acute distress  Head: Atraumatic, normocephalic  Eyes: EOM grossly in tact, no scleral icterus, no discharge  ENT: moist mucous membranes  Neurology: A&Ox 3,  5/5 strength and normal sensation throughout b/l lower extremities. no facial asymmetry. broad based unsteady gait   Respiratory: normal respiratory effort  CV:  Extremities warm and well perfused  Abdominal: Soft, non-distended, non-tender, no masses  Extremities: No edema, no deformities  Skin: warm and dry. No rashes  Spine: no midline spinal tenderness, step off or crepitus DO David, EM Attending: General: well-appearing, no acute distress  Head: Atraumatic, normocephalic  Eyes: EOM grossly in tact, no scleral icterus, no discharge  ENT: moist mucous membranes  Neurology: A&Ox 3,  5/5 strength and normal sensation throughout b/l lower extremities. no facial asymmetry. broad based unsteady gait, no 2 point discrimination on feet bases  Respiratory: normal respiratory effort  CV:  Extremities warm and well perfused  2+ DP pulses bilaterally  Abdominal: Soft, non-distended, non-tender, no masses  Extremities: No edema, no deformities  Skin: warm and dry. No rashes  Spine: no midline spinal tenderness, step off or crepitus

## 2025-02-18 NOTE — ED PROVIDER NOTE - NSICDXPASTSURGICALHX_GEN_ALL_CORE_FT
PAST SURGICAL HISTORY:  History of cholecystectomy 1977    S/P appendectomy 1966    S/P arthroscopy of shoulder left -2013, right -2014    S/P cataract extraction bilateral eyes

## 2025-02-18 NOTE — ED ADULT NURSE NOTE - CHIEF COMPLAINT QUOTE
Patient brought to Er by EMS from home for c/o bilateral leg pain and numbness as well as dizziness. Right leg pain has been going on three days ago and left leg pain and numbness has been going on for a long while. Increased shortness of breath at night. Patient is legally blind due to glaucoma. Type 2 DM: FS:105

## 2025-02-18 NOTE — ED PROVIDER NOTE - PROGRESS NOTE DETAILS
DO David, EM Attending: pt signed out to blue due to pt requiring additional level of care, s/w neuro resident who recommended cta, will evaluate the patient.

## 2025-02-18 NOTE — ED ADULT NURSE REASSESSMENT NOTE - NS ED NURSE REASSESS COMMENT FT1
Pt brought by intake to spot 17aMagi RN gave report. Pt awake and alert, , resp even and unlabored. Pt in NAD Denies CP, SOB, HA, dizziness, palpitations, blurry vision. Resting comfortably. Safety being mainted, plan of care ongoing
pt A&AOx3, legally blind from glaucoma. assisted to stand at bedside to void in urinal. pt states he is a diabetic and is requesting something to eat. . okay to eat per MILENA Smith. halal meal provided. comfort and safety measures provided.

## 2025-02-18 NOTE — ED PROVIDER NOTE - ATTENDING CONTRIBUTION TO CARE
David MORLEY: Please see the HPI, ROS, PE and MDM as authored by me. I personally made the management plan, and take responsibility for the patient's management.

## 2025-02-18 NOTE — ED ADULT NURSE NOTE - OBJECTIVE STATEMENT
Pt received c/o dizziness, R leg pain and numbness, x 3 days. Pt also c/o L leg pain and numbness x several  months. 20g IV placed in L FA, labs drawn as ordered. Pt awaiting ct scan. Safety maintained.  Phx, DMII, legally blind

## 2025-02-18 NOTE — H&P ADULT - PROBLEM SELECTOR PLAN 2
home regimen: Lantus 20-24 units AM, admelog 24 units TID QAC, mounjaro 7.5mg weekly   - check a1c  - while inpatient continue Lantus 20 units AM and admelog 16 units TID qac, adjust PRN  - FS TID QAC + bedtime with low correctional scale

## 2025-02-18 NOTE — ED PROVIDER NOTE - OBJECTIVE STATEMENT
DO David, NICKY Attendin-year-old male with a past medical history of insulin-dependent diabetes, legally blind,  HTN, HLD presenting due to concern of unsteady gait, also experiencing sciatica type symptoms.  Reports that he has had shooting pains from the left side of his back more persistent, now also experiencing on the right side.  Had an MRI performed outpatient which showed spinal stenosis.  Also experiencing hyperesthesia his left medial thigh.  No incontinence, no trouble urinating, no spinal injections, no fever, no dysuria. Pt experiencing generalized weakness in LE but not focal. He reports at home he has help until 2 PM, recently has been having trouble getting towards the fridge because he feels like he has bad spatial awareness.  However he is usually able to get around and places where he is familiar. He reports normal bowel movements. No falls or trauma.

## 2025-02-18 NOTE — H&P ADULT - PROBLEM SELECTOR PLAN 5
- continue home atorvastatin 40mg qhs legally blind in both eyes, follows with outpatient ophthalmology   - continue home eye gtts

## 2025-02-19 ENCOUNTER — TRANSCRIPTION ENCOUNTER (OUTPATIENT)
Age: 75
End: 2025-02-19

## 2025-02-19 DIAGNOSIS — J32.9 CHRONIC SINUSITIS, UNSPECIFIED: ICD-10-CM

## 2025-02-19 DIAGNOSIS — N18.30 CHRONIC KIDNEY DISEASE, STAGE 3 UNSPECIFIED: ICD-10-CM

## 2025-02-19 DIAGNOSIS — R29.898 OTHER SYMPTOMS AND SIGNS INVOLVING THE MUSCULOSKELETAL SYSTEM: ICD-10-CM

## 2025-02-19 DIAGNOSIS — E11.65 TYPE 2 DIABETES MELLITUS WITH HYPERGLYCEMIA: ICD-10-CM

## 2025-02-19 DIAGNOSIS — Z29.9 ENCOUNTER FOR PROPHYLACTIC MEASURES, UNSPECIFIED: ICD-10-CM

## 2025-02-19 DIAGNOSIS — H40.9 UNSPECIFIED GLAUCOMA: ICD-10-CM

## 2025-02-19 DIAGNOSIS — M48.061 SPINAL STENOSIS, LUMBAR REGION WITHOUT NEUROGENIC CLAUDICATION: ICD-10-CM

## 2025-02-19 DIAGNOSIS — B49 UNSPECIFIED MYCOSIS: ICD-10-CM

## 2025-02-19 DIAGNOSIS — E78.5 HYPERLIPIDEMIA, UNSPECIFIED: ICD-10-CM

## 2025-02-19 LAB
24R-OH-CALCIDIOL SERPL-MCNC: 33.9 NG/ML — SIGNIFICANT CHANGE UP (ref 30–80)
A1C WITH ESTIMATED AVERAGE GLUCOSE RESULT: 7.6 % — HIGH (ref 4–5.6)
ALBUMIN SERPL ELPH-MCNC: 4.1 G/DL — SIGNIFICANT CHANGE UP (ref 3.3–5)
ALP SERPL-CCNC: 88 U/L — SIGNIFICANT CHANGE UP (ref 40–120)
ALT FLD-CCNC: 23 U/L — SIGNIFICANT CHANGE UP (ref 4–41)
ANION GAP SERPL CALC-SCNC: 10 MMOL/L — SIGNIFICANT CHANGE UP (ref 7–14)
ANION GAP SERPL CALC-SCNC: 9 MMOL/L — SIGNIFICANT CHANGE UP (ref 7–14)
AST SERPL-CCNC: 20 U/L — SIGNIFICANT CHANGE UP (ref 4–40)
BILIRUB SERPL-MCNC: 0.2 MG/DL — SIGNIFICANT CHANGE UP (ref 0.2–1.2)
BUN SERPL-MCNC: 29 MG/DL — HIGH (ref 7–23)
BUN SERPL-MCNC: 30 MG/DL — HIGH (ref 7–23)
CALCIUM SERPL-MCNC: 8.7 MG/DL — SIGNIFICANT CHANGE UP (ref 8.4–10.5)
CALCIUM SERPL-MCNC: 8.7 MG/DL — SIGNIFICANT CHANGE UP (ref 8.4–10.5)
CHLORIDE SERPL-SCNC: 107 MMOL/L — SIGNIFICANT CHANGE UP (ref 98–107)
CHLORIDE SERPL-SCNC: 107 MMOL/L — SIGNIFICANT CHANGE UP (ref 98–107)
CO2 SERPL-SCNC: 22 MMOL/L — SIGNIFICANT CHANGE UP (ref 22–31)
CO2 SERPL-SCNC: 22 MMOL/L — SIGNIFICANT CHANGE UP (ref 22–31)
CREAT SERPL-MCNC: 1.43 MG/DL — HIGH (ref 0.5–1.3)
CREAT SERPL-MCNC: 1.43 MG/DL — HIGH (ref 0.5–1.3)
CRP SERPL-MCNC: <3 MG/L — SIGNIFICANT CHANGE UP
EGFR: 51 ML/MIN/1.73M2 — LOW
EGFR: 51 ML/MIN/1.73M2 — LOW
ERYTHROCYTE [SEDIMENTATION RATE] IN BLOOD: 13 MM/HR — SIGNIFICANT CHANGE UP (ref 1–15)
ESTIMATED AVERAGE GLUCOSE: 171 — SIGNIFICANT CHANGE UP
FERRITIN SERPL-MCNC: 82 NG/ML — SIGNIFICANT CHANGE UP (ref 30–400)
FOLATE SERPL-MCNC: 16.6 NG/ML — SIGNIFICANT CHANGE UP (ref 3.1–17.5)
GLUCOSE BLDC GLUCOMTR-MCNC: 171 MG/DL — HIGH (ref 70–99)
GLUCOSE BLDC GLUCOMTR-MCNC: 173 MG/DL — HIGH (ref 70–99)
GLUCOSE BLDC GLUCOMTR-MCNC: 201 MG/DL — HIGH (ref 70–99)
GLUCOSE BLDC GLUCOMTR-MCNC: 85 MG/DL — SIGNIFICANT CHANGE UP (ref 70–99)
GLUCOSE BLDC GLUCOMTR-MCNC: 92 MG/DL — SIGNIFICANT CHANGE UP (ref 70–99)
GLUCOSE SERPL-MCNC: 158 MG/DL — HIGH (ref 70–99)
GLUCOSE SERPL-MCNC: 161 MG/DL — HIGH (ref 70–99)
HCT VFR BLD CALC: 33.1 % — LOW (ref 39–50)
HGB BLD-MCNC: 11.3 G/DL — LOW (ref 13–17)
IRON SATN MFR SERPL: 26 % — SIGNIFICANT CHANGE UP (ref 14–50)
IRON SATN MFR SERPL: 59 UG/DL — SIGNIFICANT CHANGE UP (ref 45–165)
MAGNESIUM SERPL-MCNC: 1.9 MG/DL — SIGNIFICANT CHANGE UP (ref 1.6–2.6)
MAGNESIUM SERPL-MCNC: 1.9 MG/DL — SIGNIFICANT CHANGE UP (ref 1.6–2.6)
MCHC RBC-ENTMCNC: 31.7 PG — SIGNIFICANT CHANGE UP (ref 27–34)
MCHC RBC-ENTMCNC: 34.1 G/DL — SIGNIFICANT CHANGE UP (ref 32–36)
MCV RBC AUTO: 92.7 FL — SIGNIFICANT CHANGE UP (ref 80–100)
MRSA PCR RESULT.: SIGNIFICANT CHANGE UP
NRBC # BLD AUTO: 0 K/UL — SIGNIFICANT CHANGE UP (ref 0–0)
NRBC # FLD: 0 K/UL — SIGNIFICANT CHANGE UP (ref 0–0)
NRBC BLD AUTO-RTO: 0 /100 WBCS — SIGNIFICANT CHANGE UP (ref 0–0)
PHOSPHATE SERPL-MCNC: 2.5 MG/DL — SIGNIFICANT CHANGE UP (ref 2.5–4.5)
PHOSPHATE SERPL-MCNC: 2.5 MG/DL — SIGNIFICANT CHANGE UP (ref 2.5–4.5)
PLATELET # BLD AUTO: 179 K/UL — SIGNIFICANT CHANGE UP (ref 150–400)
POTASSIUM SERPL-MCNC: 4.1 MMOL/L — SIGNIFICANT CHANGE UP (ref 3.5–5.3)
POTASSIUM SERPL-MCNC: 4.2 MMOL/L — SIGNIFICANT CHANGE UP (ref 3.5–5.3)
POTASSIUM SERPL-SCNC: 4.1 MMOL/L — SIGNIFICANT CHANGE UP (ref 3.5–5.3)
POTASSIUM SERPL-SCNC: 4.2 MMOL/L — SIGNIFICANT CHANGE UP (ref 3.5–5.3)
PROT SERPL-MCNC: 6.9 G/DL — SIGNIFICANT CHANGE UP (ref 6–8.3)
RBC # BLD: 3.57 M/UL — LOW (ref 4.2–5.8)
RBC # FLD: 12.6 % — SIGNIFICANT CHANGE UP (ref 10.3–14.5)
S AUREUS DNA NOSE QL NAA+PROBE: SIGNIFICANT CHANGE UP
SODIUM SERPL-SCNC: 138 MMOL/L — SIGNIFICANT CHANGE UP (ref 135–145)
SODIUM SERPL-SCNC: 139 MMOL/L — SIGNIFICANT CHANGE UP (ref 135–145)
TIBC SERPL-MCNC: 223 UG/DL — SIGNIFICANT CHANGE UP (ref 220–430)
TSH SERPL-MCNC: 2.7 UIU/ML — SIGNIFICANT CHANGE UP (ref 0.27–4.2)
UIBC SERPL-MCNC: 164 UG/DL — SIGNIFICANT CHANGE UP (ref 110–370)
VIT B12 SERPL-MCNC: 1306 PG/ML — HIGH (ref 200–900)
WBC # BLD: 6.08 K/UL — SIGNIFICANT CHANGE UP (ref 3.8–10.5)
WBC # FLD AUTO: 6.08 K/UL — SIGNIFICANT CHANGE UP (ref 3.8–10.5)

## 2025-02-19 PROCEDURE — 99221 1ST HOSP IP/OBS SF/LOW 40: CPT

## 2025-02-19 PROCEDURE — 99233 SBSQ HOSP IP/OBS HIGH 50: CPT

## 2025-02-19 RX ORDER — TIRZEPATIDE 7.5 MG/.5ML
7.5 INJECTION, SOLUTION SUBCUTANEOUS
Refills: 0 | DISCHARGE

## 2025-02-19 RX ORDER — PILOCARPINE HYDROCHLORIDE OPHTHALMIC SOLUTION 20 MG/ML
1 SOLUTION/ DROPS OPHTHALMIC
Refills: 0 | Status: DISCONTINUED | OUTPATIENT
Start: 2025-02-19 | End: 2025-02-20

## 2025-02-19 RX ORDER — HEPARIN SODIUM 1000 [USP'U]/ML
5000 INJECTION INTRAVENOUS; SUBCUTANEOUS EVERY 8 HOURS
Refills: 0 | Status: DISCONTINUED | OUTPATIENT
Start: 2025-02-19 | End: 2025-02-20

## 2025-02-19 RX ORDER — DORZOLAMIDE HYDROCHLORIDE AND TIMOLOL MALEATE 20; 5 MG/ML; MG/ML
1 SOLUTION/ DROPS OPHTHALMIC
Refills: 0 | Status: DISCONTINUED | OUTPATIENT
Start: 2025-02-19 | End: 2025-02-20

## 2025-02-19 RX ORDER — ATORVASTATIN CALCIUM 80 MG/1
1 TABLET, FILM COATED ORAL
Refills: 0 | DISCHARGE

## 2025-02-19 RX ORDER — INSULIN ASPART 100 [IU]/ML
24 INJECTION, SOLUTION INTRAVENOUS; SUBCUTANEOUS
Refills: 0 | DISCHARGE

## 2025-02-19 RX ORDER — INSULIN GLARGINE-YFGN 100 [IU]/ML
20 INJECTION, SOLUTION SUBCUTANEOUS EVERY MORNING
Refills: 0 | Status: DISCONTINUED | OUTPATIENT
Start: 2025-02-19 | End: 2025-02-20

## 2025-02-19 RX ORDER — ATORVASTATIN CALCIUM 80 MG/1
40 TABLET, FILM COATED ORAL AT BEDTIME
Refills: 0 | Status: DISCONTINUED | OUTPATIENT
Start: 2025-02-19 | End: 2025-02-20

## 2025-02-19 RX ORDER — INSULIN LISPRO 100 U/ML
8 INJECTION, SOLUTION INTRAVENOUS; SUBCUTANEOUS ONCE
Refills: 0 | Status: COMPLETED | OUTPATIENT
Start: 2025-02-19 | End: 2025-02-19

## 2025-02-19 RX ORDER — PILOCARPINE HYDROCHLORIDE OPHTHALMIC SOLUTION 20 MG/ML
1 SOLUTION/ DROPS OPHTHALMIC
Refills: 0 | DISCHARGE

## 2025-02-19 RX ORDER — INSULIN LISPRO 100 U/ML
16 INJECTION, SOLUTION INTRAVENOUS; SUBCUTANEOUS
Refills: 0 | Status: DISCONTINUED | OUTPATIENT
Start: 2025-02-19 | End: 2025-02-20

## 2025-02-19 RX ORDER — SECUKINUMAB 150 MG/ML
0 INJECTION SUBCUTANEOUS
Refills: 0 | DISCHARGE

## 2025-02-19 RX ORDER — LATANOPROST PF 0.05 MG/ML
1 SOLUTION/ DROPS OPHTHALMIC AT BEDTIME
Refills: 0 | Status: DISCONTINUED | OUTPATIENT
Start: 2025-02-19 | End: 2025-02-20

## 2025-02-19 RX ORDER — INSULIN GLARGINE-YFGN 100 [IU]/ML
20 INJECTION, SOLUTION SUBCUTANEOUS
Refills: 0 | DISCHARGE

## 2025-02-19 RX ADMIN — INSULIN LISPRO 8 UNIT(S): 100 INJECTION, SOLUTION INTRAVENOUS; SUBCUTANEOUS at 18:15

## 2025-02-19 RX ADMIN — INSULIN LISPRO 16 UNIT(S): 100 INJECTION, SOLUTION INTRAVENOUS; SUBCUTANEOUS at 09:18

## 2025-02-19 RX ADMIN — HEPARIN SODIUM 5000 UNIT(S): 1000 INJECTION INTRAVENOUS; SUBCUTANEOUS at 22:32

## 2025-02-19 RX ADMIN — INSULIN LISPRO 1: 100 INJECTION, SOLUTION INTRAVENOUS; SUBCUTANEOUS at 12:55

## 2025-02-19 RX ADMIN — INSULIN GLARGINE-YFGN 20 UNIT(S): 100 INJECTION, SOLUTION SUBCUTANEOUS at 09:38

## 2025-02-19 RX ADMIN — GABAPENTIN 300 MILLIGRAM(S): 400 CAPSULE ORAL at 05:48

## 2025-02-19 RX ADMIN — LIDOCAINE HYDROCHLORIDE 1 PATCH: 20 JELLY TOPICAL at 06:30

## 2025-02-19 RX ADMIN — DORZOLAMIDE HYDROCHLORIDE AND TIMOLOL MALEATE 1 DROP(S): 20; 5 SOLUTION/ DROPS OPHTHALMIC at 11:51

## 2025-02-19 RX ADMIN — LATANOPROST PF 1 DROP(S): 0.05 SOLUTION/ DROPS OPHTHALMIC at 22:32

## 2025-02-19 RX ADMIN — DORZOLAMIDE HYDROCHLORIDE AND TIMOLOL MALEATE 1 DROP(S): 20; 5 SOLUTION/ DROPS OPHTHALMIC at 17:38

## 2025-02-19 RX ADMIN — HEPARIN SODIUM 5000 UNIT(S): 1000 INJECTION INTRAVENOUS; SUBCUTANEOUS at 05:49

## 2025-02-19 RX ADMIN — INSULIN LISPRO 1: 100 INJECTION, SOLUTION INTRAVENOUS; SUBCUTANEOUS at 09:17

## 2025-02-19 RX ADMIN — PILOCARPINE HYDROCHLORIDE OPHTHALMIC SOLUTION 1 DROP(S): 20 SOLUTION/ DROPS OPHTHALMIC at 17:38

## 2025-02-19 RX ADMIN — Medication 1 APPLICATION(S): at 11:59

## 2025-02-19 RX ADMIN — ATORVASTATIN CALCIUM 40 MILLIGRAM(S): 80 TABLET, FILM COATED ORAL at 22:32

## 2025-02-19 RX ADMIN — Medication 650 MILLIGRAM(S): at 02:39

## 2025-02-19 RX ADMIN — GABAPENTIN 300 MILLIGRAM(S): 400 CAPSULE ORAL at 17:41

## 2025-02-19 RX ADMIN — INSULIN LISPRO 16 UNIT(S): 100 INJECTION, SOLUTION INTRAVENOUS; SUBCUTANEOUS at 12:56

## 2025-02-19 RX ADMIN — Medication 650 MILLIGRAM(S): at 02:09

## 2025-02-19 RX ADMIN — PILOCARPINE HYDROCHLORIDE OPHTHALMIC SOLUTION 1 DROP(S): 20 SOLUTION/ DROPS OPHTHALMIC at 11:51

## 2025-02-19 RX ADMIN — HEPARIN SODIUM 5000 UNIT(S): 1000 INJECTION INTRAVENOUS; SUBCUTANEOUS at 13:00

## 2025-02-19 NOTE — PROVIDER CONTACT NOTE (OTHER) - ACTION/TREATMENT ORDERED:
[facility]  [facility]  One time order of 8u.  Administered as order. Patient aet his dinner.

## 2025-02-19 NOTE — PATIENT PROFILE ADULT - VISION (WITH CORRECTIVE LENSES IF THE PATIENT USUALLY WEARS THEM):
Patient is legally blind/Severely impaired: cannot locate objects without hearing or touching them or patient nonresponsive.

## 2025-02-19 NOTE — PHYSICAL THERAPY INITIAL EVALUATION ADULT - ADDITIONAL COMMENTS
Pt states he lives in a house alone, 5 steps to enter. Pt has a home health aide 6 hours every day to assist with ADLs, family also visits and assists as needed. Prior to admission pt reports being independent in ADLs and ambulation without device. Pt reports although he is blind he is able to ambulate around his house based on memory and feel. Pt owns a rolling walker and a cane.    Following evaluation, pt was left semireclined in bed in no distress, call bell in reach. Pt states he lives in a house alone, 5 steps to enter. Pt has a home health aide 6 hours every day to assist with ADLs, family also visits and assists as needed. Prior to admission pt reports being independent in ambulation without device. Pt reports although he is blind he is able to ambulate around his house based on memory and feel. Pt owns a rolling walker and a cane.    Following evaluation, pt was left semireclined in bed in no distress, call bell in reach.

## 2025-02-19 NOTE — PATIENT PROFILE ADULT - FALL HARM RISK - RISK INTERVENTIONS

## 2025-02-19 NOTE — PROGRESS NOTE ADULT - PROBLEM SELECTOR PLAN 4
SCr 1.66 on admission, was ~1.3-1.4 in 2021, likely progression of CKD vs RAYMOND on CKD  - avoid nephrotoxic agents, renally dose medications  - continue to trend, monitor UOP SCr 1.66 on admission, was ~1.3-1.4 in 2021, likely progression of CKD vs RAYMOND on CKD  - avoid nephrotoxic agents, renally dose medications  - continue to trend, monitor UOP    Cr down to baseline 1.4

## 2025-02-19 NOTE — PROGRESS NOTE ADULT - PROBLEM SELECTOR PLAN 7
Diet: DASH/CC    DVT ppx:  heparin SQ  Dispo: pending clinical course Diet: DASH/CC    DVT ppx:  heparin SQ  Dispo: pending PT

## 2025-02-19 NOTE — PHYSICAL THERAPY INITIAL EVALUATION ADULT - PERTINENT HX OF CURRENT PROBLEM, REHAB EVAL
75 year old male presenting with acute on chronic bilateral lower extremity weakness, ongoing for past 6 months, worsening for past several days. CT Head - No acute intracranial hemorrhage, mass effect, or midline shift.

## 2025-02-19 NOTE — PROGRESS NOTE ADULT - SUBJECTIVE AND OBJECTIVE BOX
[facility] Division of Hospital Medicine  Amandeep Davis MD  Available via MS Teams  Pager: [phone]    SUBJECTIVE / OVERNIGHT EVENTS:  Patient seen and examined at the bedside  Patient with no acute complaints  Patient states that he has had chronic weakness/ decreased sensation to his LLE, states he has had worsening symptoms to his RLE over the last two weeks  Denies history of falls, notes that he has had occasional dizziness  Ambulates with a cane in the home, was given a walker as well but patient states his house is too small to use the walker well.  States he had an MRI done outpatient last month    States that he has been blind for about two years now  Lives at home - has an aide from 8-2pm Mon-Friday  On weekend, family helps him with meals/ ADLs  Has a nephew that lives in his basement.  Daughter that lives close by.          MEDICATIONS  (STANDING):  atorvastatin 40 milliGRAM(s) Oral at bedtime  chlorhexidine 2% Cloths 1 Application(s) Topical daily  dextrose 5%. 1000 milliLiter(s) (100 mL/Hr) IV Continuous <Continuous>  dextrose 5%. 1000 milliLiter(s) (50 mL/Hr) IV Continuous <Continuous>  dextrose 50% Injectable 25 Gram(s) IV Push once  dextrose 50% Injectable 12.5 Gram(s) IV Push once  dextrose 50% Injectable 25 Gram(s) IV Push once  dorzolamide 2%/timolol 0.5% Ophthalmic Solution 1 Drop(s) Both EYES two times a day  gabapentin 300 milliGRAM(s) Oral two times a day  glucagon  Injectable 1 milliGRAM(s) IntraMuscular once  heparin   Injectable 5000 Unit(s) SubCutaneous every 8 hours  insulin glargine Injectable (LANTUS) 20 Unit(s) SubCutaneous every morning  insulin lispro (ADMELOG) corrective regimen sliding scale   SubCutaneous three times a day before meals  insulin lispro (ADMELOG) corrective regimen sliding scale   SubCutaneous at bedtime  insulin lispro Injectable (ADMELOG) 16 Unit(s) SubCutaneous three times a day before meals  latanoprost 0.005% Ophthalmic Solution 1 Drop(s) Both EYES at bedtime  pilocarpine 4% Solution 1 Drop(s) Both EYES four times a day    MEDICATIONS  (PRN):  acetaminophen     Tablet .. 650 milliGRAM(s) Oral every 6 hours PRN Temp greater or equal to 38C (100.4F), Mild Pain (1 - 3)  dextrose Oral Gel 15 Gram(s) Oral once PRN Blood Glucose LESS THAN 70 milliGRAM(s)/deciliter  melatonin 3 milliGRAM(s) Oral at bedtime PRN Insomnia      I&O's Summary    18 Feb 2025 07:01  -  19 Feb 2025 07:00  --------------------------------------------------------  IN: 150 mL / OUT: 200 mL / NET: -50 mL    19 Feb 2025 07:01  -  19 Feb 2025 11:40  --------------------------------------------------------  IN: 300 mL / OUT: 300 mL / NET: 0 mL        PHYSICAL EXAM:  Vital Signs Last 24 Hrs  T(C): 36.7 (19 Feb 2025 10:50), Max: 37.2 (18 Feb 2025 21:12)  T(F): 98 (19 Feb 2025 10:50), Max: 98.9 (18 Feb 2025 21:12)  HR: 94 (19 Feb 2025 10:50) (72 - 94)  BP: 153/81 (19 Feb 2025 10:50) (127/70 - 177/99)  BP(mean): 78 (19 Feb 2025 01:03) (78 - 78)  RR: 18 (19 Feb 2025 10:50) (16 - 18)  SpO2: 100% (19 Feb 2025 10:50) (98% - 100%)    Parameters below as of 19 Feb 2025 10:50  Patient On (Oxygen Delivery Method): room air      CONSTITUTIONAL: elderly man, legally blind, in no acute distress  NECK: Supple, no thyromegaly  RESPIRATORY: Normal respiratory effort; lungs are clear to auscultation bilaterally  CARDIOVASCULAR: S1 and S2 +ve, no murmur/rub/gallop;   ABDOMEN: Nontender to palpation, normoactive bowel sounds, no rebound/guarding;  EXTREMITIES: no significant pedal edema.    NEUROLOGY: CN 2-12 are intact and symmetric; no gross motor or sensory deficits.  Ambulates in the room with slight assistance without difficulty.   PSYCH: A+O to person, place, and time; affect appropriate  SKIN: No rashes; no palpable lesions      LABS:                        11.3   6.08  )-----------( 179      ( 19 Feb 2025 05:47 )             33.1     02-19    139  |  107  |  30[H]  ----------------------------<  158[H]  4.1   |  22  |  1.43[H]    Ca    8.7      19 Feb 2025 05:47  Phos  2.5     02-19  Mg     1.90     02-19    TPro  6.9  /  Alb  4.1  /  TBili  0.2  /  DBili  x   /  AST  20  /  ALT  23  /  AlkPhos  88  02-19    PT/INR - ( 18 Feb 2025 17:28 )   PT: 10.5 sec;   INR: 0.90 ratio         PTT - ( 18 Feb 2025 17:28 )  PTT:30.3 sec      Urinalysis Basic - ( 19 Feb 2025 05:47 )    Color: x / Appearance: x / SG: x / pH: x  Gluc: 158 mg/dL / Ketone: x  / Bili: x / Urobili: x   Blood: x / Protein: x / Nitrite: x   Leuk Esterase: x / RBC: x / WBC x   Sq Epi: x / Non Sq Epi: x / Bacteria: x        Urinalysis with Rflx Culture (collected 18 Feb 2025 22:27)

## 2025-02-19 NOTE — PROGRESS NOTE ADULT - PROBLEM SELECTOR PLAN 3
CTH incidentally with evidence of chronic fungal sinusitis most notably involving the left frontal sinus and right   maxillary sinus  - outpatient follow up

## 2025-02-19 NOTE — PROGRESS NOTE ADULT - PROBLEM SELECTOR PLAN 1
dx'ed with lumbar stenosis via outpatient MRI 1 month prior ordered by outpatient pain management, without alarm symptoms, worsening radiculopathy and weakness over the past several days   - evaluated by neuro in ED, recs appreciated. lab work ordered for AM   - obtain outpatient MRI records, pt is a poor historian, does not recall name of doctor of facility, requesting to get repeat MRI scans in hospital  - if cannot obtain, can get inpatient, MRI non-con ordered   - start gabapentin 300mg BID for neuropathic pain   - noted to be previously on cymbalta and flexeril, both since discontinued per pt, unsure why or by who  - lidocaine patch   - already undergoing PT outpatient 3x a week, can get PT eval here Patient presents with unsteady gait and chronic leg weakness - now worsening over two weeks  Per patient - dx'ed with lumbar stenosis via outpatient MRI 1 month prior ordered by outpatient pain management, without alarm symptoms, worsening radiculopathy and weakness over the past several days     evaluated by neuro in ED, recs appreciated.    will f/u labs    working to obtain outpatient MRI records before ordering inpatient studies.   Gabapentin 300mg BID started for neuropathic pain

## 2025-02-19 NOTE — DISCHARGE NOTE NURSING/CASE MANAGEMENT/SOCIAL WORK - PATIENT PORTAL LINK FT
You can access the FollowMyHealth Patient Portal offered by [facility] by registering at the following website: [email or web address]. By joining [facility]’s FollowMyHealth portal, you will also be able to view your health information using other applications (apps) compatible with our system.

## 2025-02-19 NOTE — PROGRESS NOTE ADULT - PROBLEM SELECTOR PLAN 2
home regimen: Lantus 20-24 units AM, admelog 24 units TID QAC, mounjaro 7.5mg weekly   - check a1c  - while inpatient continue Lantus 20 units AM and admelog 16 units TID qac, adjust PRN  - FS TID QAC + bedtime with low correctional scale home regimen: Lantus 20-24 units AM, admelog 24 units TID QAC, mounjaro 7.5mg weekly   A1c 7.6  - while inpatient continue Lantus 20 units AM and admelog 16 units TID qac, adjust PRN  - FS TID QAC + bedtime with low correctional scale

## 2025-02-19 NOTE — DISCHARGE NOTE NURSING/CASE MANAGEMENT/SOCIAL WORK - FINANCIAL ASSISTANCE
[facility] provides services at a reduced cost to those who are determined to be eligible through [facility]’s financial assistance program. Information regarding [facility]’s financial assistance program can be found by going to [email or web address] or by calling [phone].

## 2025-02-19 NOTE — CHART NOTE - NSCHARTNOTEFT_GEN_A_CORE
Received MRI report from outpatient provider - Curtis Castellanos MD    MRI L spine w/o contrast obtained on 01/16/25    Impression:  L3-4 Mild bilateral foraminal stenosis  L4-5 Moderate to severe spinal canal stenosis, moderate bilateral foraminal stenosis  L5-S1 Moderate to severe bilateral foraminal stenosis.  Mild narrowing of thecal sac from epidural lipomatosis.

## 2025-02-20 ENCOUNTER — TRANSCRIPTION ENCOUNTER (OUTPATIENT)
Age: 75
End: 2025-02-20

## 2025-02-20 VITALS
OXYGEN SATURATION: 99 % | DIASTOLIC BLOOD PRESSURE: 69 MMHG | RESPIRATION RATE: 18 BRPM | TEMPERATURE: 98 F | SYSTOLIC BLOOD PRESSURE: 135 MMHG | HEART RATE: 78 BPM

## 2025-02-20 LAB
ACE SERPL-CCNC: 60 U/L — SIGNIFICANT CHANGE UP (ref 14–82)
ANION GAP SERPL CALC-SCNC: 12 MMOL/L — SIGNIFICANT CHANGE UP (ref 7–14)
BUN SERPL-MCNC: 32 MG/DL — HIGH (ref 7–23)
CALCIUM SERPL-MCNC: 8.7 MG/DL — SIGNIFICANT CHANGE UP (ref 8.4–10.5)
CHLORIDE SERPL-SCNC: 105 MMOL/L — SIGNIFICANT CHANGE UP (ref 98–107)
CO2 SERPL-SCNC: 20 MMOL/L — LOW (ref 22–31)
CREAT SERPL-MCNC: 1.24 MG/DL — SIGNIFICANT CHANGE UP (ref 0.5–1.3)
EGFR: 61 ML/MIN/1.73M2 — SIGNIFICANT CHANGE UP
GLUCOSE BLDC GLUCOMTR-MCNC: 135 MG/DL — HIGH (ref 70–99)
GLUCOSE SERPL-MCNC: 123 MG/DL — HIGH (ref 70–99)
HCT VFR BLD CALC: 36.4 % — LOW (ref 39–50)
HGB BLD-MCNC: 12 G/DL — LOW (ref 13–17)
MAGNESIUM SERPL-MCNC: 2.1 MG/DL — SIGNIFICANT CHANGE UP (ref 1.6–2.6)
MCHC RBC-ENTMCNC: 30.9 PG — SIGNIFICANT CHANGE UP (ref 27–34)
MCHC RBC-ENTMCNC: 33 G/DL — SIGNIFICANT CHANGE UP (ref 32–36)
MCV RBC AUTO: 93.8 FL — SIGNIFICANT CHANGE UP (ref 80–100)
NRBC # BLD AUTO: 0 K/UL — SIGNIFICANT CHANGE UP (ref 0–0)
NRBC # FLD: 0 K/UL — SIGNIFICANT CHANGE UP (ref 0–0)
NRBC BLD AUTO-RTO: 0 /100 WBCS — SIGNIFICANT CHANGE UP (ref 0–0)
PHOSPHATE SERPL-MCNC: 2.9 MG/DL — SIGNIFICANT CHANGE UP (ref 2.5–4.5)
PLATELET # BLD AUTO: 194 K/UL — SIGNIFICANT CHANGE UP (ref 150–400)
POTASSIUM SERPL-MCNC: 4.1 MMOL/L — SIGNIFICANT CHANGE UP (ref 3.5–5.3)
POTASSIUM SERPL-SCNC: 4.1 MMOL/L — SIGNIFICANT CHANGE UP (ref 3.5–5.3)
RBC # BLD: 3.88 M/UL — LOW (ref 4.2–5.8)
RBC # FLD: 13 % — SIGNIFICANT CHANGE UP (ref 10.3–14.5)
SODIUM SERPL-SCNC: 137 MMOL/L — SIGNIFICANT CHANGE UP (ref 135–145)
WBC # BLD: 6.53 K/UL — SIGNIFICANT CHANGE UP (ref 3.8–10.5)
WBC # FLD AUTO: 6.53 K/UL — SIGNIFICANT CHANGE UP (ref 3.8–10.5)

## 2025-02-20 PROCEDURE — 99239 HOSP IP/OBS DSCHRG MGMT >30: CPT

## 2025-02-20 RX ORDER — GABAPENTIN 400 MG/1
1 CAPSULE ORAL
Qty: 60 | Refills: 0
Start: 2025-02-20 | End: 2025-03-21

## 2025-02-20 RX ADMIN — PILOCARPINE HYDROCHLORIDE OPHTHALMIC SOLUTION 1 DROP(S): 20 SOLUTION/ DROPS OPHTHALMIC at 06:12

## 2025-02-20 RX ADMIN — INSULIN GLARGINE-YFGN 20 UNIT(S): 100 INJECTION, SOLUTION SUBCUTANEOUS at 09:11

## 2025-02-20 RX ADMIN — GABAPENTIN 300 MILLIGRAM(S): 400 CAPSULE ORAL at 06:12

## 2025-02-20 RX ADMIN — HEPARIN SODIUM 5000 UNIT(S): 1000 INJECTION INTRAVENOUS; SUBCUTANEOUS at 06:12

## 2025-02-20 RX ADMIN — INSULIN LISPRO 16 UNIT(S): 100 INJECTION, SOLUTION INTRAVENOUS; SUBCUTANEOUS at 09:12

## 2025-02-20 RX ADMIN — DORZOLAMIDE HYDROCHLORIDE AND TIMOLOL MALEATE 1 DROP(S): 20; 5 SOLUTION/ DROPS OPHTHALMIC at 06:11

## 2025-02-20 NOTE — DISCHARGE NOTE PROVIDER - HOSPITAL COURSE
HPI:  75M with history of psoriatic arthritis, HTN, HLD, T2DM c/b diabetic retinopathy, glaucoma, and legally blind in both eyes presenting with acute on chronic bilateral leg weakness and gait instability over the past 6 months, worsening over the past few days. Pt states that sometimes when he walks or even just standing, his legs would go weak, and feel as if he'll fall. Getting more frequent, happening up to 10 times/day over the past few days. With associated pain shooting from hip down to knee or shin, mostly left side, occasionally right side. Intermittent numbness/tingling. No bowel or urinary incontinence, falls, trauma to area.   Had brought it up to his rheumatologist and was referred to neurology, which he did not see yet. Did see pain management a month ago and had a MRI performed that reportedly showed spinal stenosis (not available on HIE), was unaware of any next steps, and that pain doctor no longer accepts his insurance so was unable to return. Has been doing physical therapy for this 3x a week for a couple of months now. Pt lives alone with a HHA that comes in around 8a-2pm every day. No actual falls but daughter worried given the worsening symptoms and advised him to come to the hospital.   Has a list of medications printed from last month but states that multiple medications on that has since by discontinued for unclear reasons and cannot recall by who.   In ED, hypertensive with SBPs to 170s. Labs notable for mild hypophosphatemia to 2.4 and elevated SCr of 1.66, unclear if true RAYMOND vs CKD. CTH without acute intracranial pathology, with chronic paranasal sinus disease. UA negative for infection. Flu/covid/rsv negative. Received IV acetaminophen, meclizine 12.6mg PO, and lidocaine patch.  Was evaluated by neurology, recommending obtaining outpatient MRI records vs repeat MRI. Was not a CDU candidate given inability to ambulate. Admitted to medicine for further evaluation.    (18 Feb 2025 23:09)    Hospital Course:  Patient was admitted to medicine secondary to unsteady gait, bilateral leg weakness associated with suspected spinal stenosis.  Patient reported that he had L spine MRI done outpatient.  Patient was evaluated by PT - recommended for outpatient PT.  Patient already participating in outpatient PT as well.  Ambulates well with assistance at home.  Seen by neurology - was started on gabapentin for neuropathy.  MRI results obtained from outpatient radiology center - findings reviewed.  No further inpatient workup as per neurology.  Patient lives at home and is legally blind, has HHA services 5 days a week, and also has limited service.  Will inquire to see if any further services can be added.  Patient will be discharged home with re-establishment of services.        Important Medication Changes and Reason:  Added gabapentin for neuropathy    Active or Pending Issues Requiring Follow-up:    Advanced Directives:   [ x] Full code  [ ] DNR  [ ] Hospice    Discharge Diagnoses:  Neuropathy associated with chronic lumbar radiculopathy/ spinal stenosis  Glaucoma with blindness       HPI:  75M with history of psoriatic arthritis, HTN, HLD, T2DM c/b diabetic retinopathy, glaucoma, and legally blind in both eyes presenting with acute on chronic bilateral leg weakness and gait instability over the past 6 months, worsening over the past few days. Pt states that sometimes when he walks or even just standing, his legs would go weak, and feel as if he'll fall. Getting more frequent, happening up to 10 times/day over the past few days. With associated pain shooting from hip down to knee or shin, mostly left side, occasionally right side. Intermittent numbness/tingling. No bowel or urinary incontinence, falls, trauma to area.   Had brought it up to his rheumatologist and was referred to neurology, which he did not see yet. Did see pain management a month ago and had a MRI performed that reportedly showed spinal stenosis (not available on HIE), was unaware of any next steps, and that pain doctor no longer accepts his insurance so was unable to return. Has been doing physical therapy for this 3x a week for a couple of months now. Pt lives alone with a HHA that comes in around 8a-2pm every day. No actual falls but daughter worried given the worsening symptoms and advised him to come to the hospital.   Has a list of medications printed from last month but states that multiple medications on that has since by discontinued for unclear reasons and cannot recall by who.   In ED, hypertensive with SBPs to 170s. Labs notable for mild hypophosphatemia to 2.4 and elevated SCr of 1.66, unclear if true RAYMOND vs CKD. CTH without acute intracranial pathology, with chronic paranasal sinus disease. UA negative for infection. Flu/covid/rsv negative. Received IV acetaminophen, meclizine 12.6mg PO, and lidocaine patch.  Was evaluated by neurology, recommending obtaining outpatient MRI records vs repeat MRI. Was not a CDU candidate given inability to ambulate. Admitted to medicine for further evaluation.    (18 Feb 2025 23:09)    Hospital Course:  Patient was admitted to medicine secondary to unsteady gait, bilateral leg weakness associated with suspected spinal stenosis.  Patient reported that he had L spine MRI done outpatient.  Patient was evaluated by PT - recommended for outpatient PT.  Patient already participating in outpatient PT as well.  Ambulates well with assistance at home.  Seen by neurology - was started on gabapentin for neuropathy.  MRI results obtained from outpatient radiology center - findings reviewed.  No further inpatient workup as per neurology.  Patient lives at home and is legally blind, has HHA services 5 days a week, and also has limited service.  Will inquire to see if any further services can be added.  Patient will be discharged home with re-establishment of services.        Important Medication Changes and Reason:  Added gabapentin for neuropathy    Active or Pending Issues Requiring Follow-up:    Advanced Directives:   [ x] Full code  [ ] DNR  [ ] Hospice    Discharge Diagnoses:  Neuropathy associated with chronic lumbar radiculopathy/ spinal stenosis  Glaucoma with blindness    APPTS ARE READY TO BE MADE: [X] YES    Best Family or Patient Contact (if needed):    Additional Information about above appointments (if needed):    1: Zenobia Song  2:   3:     Other comments or requests:          HPI:  75M with history of psoriatic arthritis, HTN, HLD, T2DM c/b diabetic retinopathy, glaucoma, and legally blind in both eyes presenting with acute on chronic bilateral leg weakness and gait instability over the past 6 months, worsening over the past few days. Pt states that sometimes when he walks or even just standing, his legs would go weak, and feel as if he'll fall. Getting more frequent, happening up to 10 times/day over the past few days. With associated pain shooting from hip down to knee or shin, mostly left side, occasionally right side. Intermittent numbness/tingling. No bowel or urinary incontinence, falls, trauma to area.   Had brought it up to his rheumatologist and was referred to neurology, which he did not see yet. Did see pain management a month ago and had a MRI performed that reportedly showed spinal stenosis (not available on HIE), was unaware of any next steps, and that pain doctor no longer accepts his insurance so was unable to return. Has been doing physical therapy for this 3x a week for a couple of months now. Pt lives alone with a HHA that comes in around 8a-2pm every day. No actual falls but daughter worried given the worsening symptoms and advised him to come to the hospital.   Has a list of medications printed from last month but states that multiple medications on that has since by discontinued for unclear reasons and cannot recall by who.   In ED, hypertensive with SBPs to 170s. Labs notable for mild hypophosphatemia to 2.4 and elevated SCr of 1.66, unclear if true RAYMOND vs CKD. CTH without acute intracranial pathology, with chronic paranasal sinus disease. UA negative for infection. Flu/covid/rsv negative. Received IV acetaminophen, meclizine 12.6mg PO, and lidocaine patch.  Was evaluated by neurology, recommending obtaining outpatient MRI records vs repeat MRI. Was not a CDU candidate given inability to ambulate. Admitted to medicine for further evaluation.    (18 Feb 2025 23:09)    Hospital Course:  Patient was admitted to medicine secondary to unsteady gait, bilateral leg weakness associated with suspected spinal stenosis.  Patient reported that he had L spine MRI done outpatient.  Patient was evaluated by PT - recommended for outpatient PT.  Patient already participating in outpatient PT as well.  Ambulates well with assistance at home.  Seen by neurology - was started on gabapentin for neuropathy.  MRI results obtained from outpatient radiology center - findings reviewed.  No further inpatient workup as per neurology.  Patient lives at home and is legally blind, has HHA services 5 days a week. Patient will be discharged home with re-establishment of services.        Important Medication Changes and Reason:  Added gabapentin for neuropathy    Active or Pending Issues Requiring Follow-up:    Advanced Directives:   [ x] Full code  [ ] DNR  [ ] Hospice    Discharge Diagnoses:  Neuropathy associated with chronic lumbar radiculopathy/ spinal stenosis  Glaucoma with blindness    APPTS ARE READY TO BE MADE: [X] YES    Best Family or Patient Contact (if needed):    Additional Information about above appointments (if needed):    1: Zenobia Song  2:   3:     Other comments or requests:

## 2025-02-20 NOTE — DISCHARGE NOTE PROVIDER - ATTENDING DISCHARGE PHYSICAL EXAMINATION:
CONSTITUTIONAL: elderly man, legally blind, in no acute distress  NECK: Supple, no thyromegaly  RESPIRATORY: Normal respiratory effort; lungs are clear to auscultation bilaterally  CARDIOVASCULAR: S1 and S2 +ve, no murmur/rub/gallop;   ABDOMEN: Nontender to palpation, normoactive bowel sounds, no rebound/guarding;  EXTREMITIES: no significant pedal edema.    NEUROLOGY: CN 2-12 are intact and symmetric; no gross motor or sensory deficits.  Ambulates in the room with slight assistance without difficulty.   PSYCH: A+O to person, place, and time; affect appropriate  SKIN: No rashes; no palpable lesions

## 2025-02-20 NOTE — DISCHARGE NOTE PROVIDER - NSDCCPCAREPLAN_GEN_ALL_CORE_FT
PRINCIPAL DISCHARGE DIAGNOSIS  Diagnosis: Lumbar spinal stenosis  Assessment and Plan of Treatment: Patient was admitted to medicine secondary to unsteady gait, bilateral leg weakness associated with suspected spinal stenosis.  Patient reported that he had L spine MRI done outpatient.  Patient was evaluated by PT - recommended for outpatient PT.  Patient already participating in outpatient PT as well.  Ambulates well with assistance at home.  Seen by neurology - was started on gabapentin for neuropathy.  MRI results obtained from outpatient radiology center - findings reviewed.  No further inpatient workup as per neurology.  Patient lives at home and is legally blind, has HHA services 5 days a week, and also has limited service.  Will inquire to see if any further services can be added.  Patient will be discharged home with re-establishment of services.

## 2025-02-20 NOTE — DISCHARGE NOTE PROVIDER - NSDCMRMEDTOKEN_GEN_ALL_CORE_FT
atorvastatin 40 mg oral tablet: 1 tab(s) orally once a day (at bedtime)  Cosopt 2.23%-0.68% ophthalmic solution: 1 drop(s) to each affected eye 2 times a day  Lantus Solostar Pen 100 units/mL subcutaneous solution: 20 unit(s) subcutaneous once a day  Mounjaro 7.5 mg/0.5 mL subcutaneous solution: 7.5 milligram(s) subcutaneously  NovoLOG FlexTouch 100 units/mL subcutaneous solution: 24 unit(s) subcutaneous 3 times a day  Pilocar 4% ophthalmic solution: 1 drop(s) in each affected eye 4 times a day  secukinumab:   Xalatan 0.005% ophthalmic solution: 1 drop(s) to each affected eye once a day (at bedtime)   atorvastatin 40 mg oral tablet: 1 tab(s) orally once a day (at bedtime)  Cosopt 2.23%-0.68% ophthalmic solution: 1 drop(s) to each affected eye 2 times a day  gabapentin 300 mg oral capsule: 1 cap(s) orally 2 times a day  Lantus Solostar Pen 100 units/mL subcutaneous solution: 20 unit(s) subcutaneous once a day  Mounjaro 7.5 mg/0.5 mL subcutaneous solution: 7.5 milligram(s) subcutaneously  NovoLOG FlexTouch 100 units/mL subcutaneous solution: 24 unit(s) subcutaneous 3 times a day  Pilocar 4% ophthalmic solution: 1 drop(s) in each affected eye 4 times a day  secukinumab:   Xalatan 0.005% ophthalmic solution: 1 drop(s) to each affected eye once a day (at bedtime)

## 2025-02-21 ENCOUNTER — TRANSCRIPTION ENCOUNTER (OUTPATIENT)
Age: 75
End: 2025-02-21

## 2025-02-24 LAB
A-TOCOPHEROL VIT E SERPL-MCNC: 15 MG/L — SIGNIFICANT CHANGE UP (ref 9–29)
BETA+GAMMA TOCOPHEROL SERPL-MCNC: 1.1 MG/L — SIGNIFICANT CHANGE UP (ref 0.5–4.9)

## 2025-02-25 LAB — IL2 FLD-MCNC: <31.2 PG/ML — SIGNIFICANT CHANGE UP (ref 0–31.2)

## 2025-02-27 ENCOUNTER — APPOINTMENT (OUTPATIENT)
Dept: CARE COORDINATION | Facility: HOME HEALTH | Age: 75
End: 2025-02-27

## 2025-02-27 VITALS
BODY MASS INDEX: 23.55 KG/M2 | OXYGEN SATURATION: 99 % | SYSTOLIC BLOOD PRESSURE: 166 MMHG | TEMPERATURE: 98.3 F | HEART RATE: 87 BPM | HEIGHT: 62 IN | DIASTOLIC BLOOD PRESSURE: 78 MMHG | RESPIRATION RATE: 16 BRPM | WEIGHT: 128 LBS

## 2025-02-27 DIAGNOSIS — Z79.4 TYPE 2 DIABETES MELLITUS WITH OTHER SPECIFIED COMPLICATION: ICD-10-CM

## 2025-02-27 DIAGNOSIS — M48.061 SPINAL STENOSIS, LUMBAR REGION WITHOUT NEUROGENIC CLAUDICATION: ICD-10-CM

## 2025-02-27 DIAGNOSIS — I10 ESSENTIAL (PRIMARY) HYPERTENSION: ICD-10-CM

## 2025-02-27 DIAGNOSIS — E78.5 HYPERLIPIDEMIA, UNSPECIFIED: ICD-10-CM

## 2025-02-27 DIAGNOSIS — H40.9 UNSPECIFIED GLAUCOMA: ICD-10-CM

## 2025-02-27 DIAGNOSIS — E11.69 TYPE 2 DIABETES MELLITUS WITH OTHER SPECIFIED COMPLICATION: ICD-10-CM

## 2025-02-27 PROCEDURE — 99495 TRANSJ CARE MGMT MOD F2F 14D: CPT

## 2025-03-08 PROBLEM — M48.061 SPINAL STENOSIS, LUMBAR: Status: ACTIVE | Noted: 2025-03-08

## 2025-03-08 PROBLEM — H40.9 GLAUCOMA OF BOTH EYES, UNSPECIFIED GLAUCOMA TYPE: Status: ACTIVE | Noted: 2025-03-08

## 2025-03-08 PROBLEM — E78.5 HYPERLIPEMIA: Status: ACTIVE | Noted: 2025-03-08

## 2025-03-08 PROBLEM — E11.69 TYPE 2 DIABETES MELLITUS WITH OTHER SPECIFIED COMPLICATION, WITH LONG-TERM CURRENT USE OF INSULIN: Status: ACTIVE | Noted: 2025-03-08

## 2025-03-08 RX ORDER — INSULIN GLARGINE 100 [IU]/ML
100 INJECTION, SOLUTION SUBCUTANEOUS DAILY
Refills: 0 | Status: ACTIVE | COMMUNITY
Start: 2025-03-08

## 2025-03-08 RX ORDER — ATORVASTATIN CALCIUM 40 MG/1
40 TABLET, FILM COATED ORAL
Refills: 0 | Status: ACTIVE | COMMUNITY
Start: 2025-03-08

## 2025-03-08 RX ORDER — GABAPENTIN 300 MG/1
300 CAPSULE ORAL TWICE DAILY
Refills: 0 | Status: ACTIVE | COMMUNITY
Start: 2025-03-08

## 2025-03-08 RX ORDER — INSULIN ASPART 100 [IU]/ML
100 INJECTION, SOLUTION INTRAVENOUS; SUBCUTANEOUS 3 TIMES DAILY
Refills: 0 | Status: ACTIVE | COMMUNITY
Start: 2025-03-08

## 2025-03-08 RX ORDER — DORZOLAMIDE HYDROCHLORIDE AND TIMOLOL MALEATE 20; 5 MG/ML; MG/ML
2-0.5 SOLUTION/ DROPS OPHTHALMIC TWICE DAILY
Refills: 0 | Status: ACTIVE | COMMUNITY
Start: 2025-03-08

## 2025-03-08 RX ORDER — TIRZEPATIDE 7.5 MG/.5ML
7.5 INJECTION, SOLUTION SUBCUTANEOUS WEEKLY
Refills: 0 | Status: ACTIVE | COMMUNITY
Start: 2025-03-08

## 2025-03-10 ENCOUNTER — TRANSCRIPTION ENCOUNTER (OUTPATIENT)
Age: 75
End: 2025-03-10

## 2025-07-01 NOTE — DISCHARGE NOTE NURSING/CASE MANAGEMENT/SOCIAL WORK - NSSCNAMETXT_GEN_ALL_CORE
Niya Lockwood RN [facility] [phone] [facility]  [facility]